# Patient Record
Sex: MALE | Race: AMERICAN INDIAN OR ALASKA NATIVE
[De-identification: names, ages, dates, MRNs, and addresses within clinical notes are randomized per-mention and may not be internally consistent; named-entity substitution may affect disease eponyms.]

---

## 2019-06-06 ENCOUNTER — HOSPITAL ENCOUNTER (EMERGENCY)
Dept: HOSPITAL 5 - ED | Age: 84
Discharge: HOME | End: 2019-06-06
Payer: COMMERCIAL

## 2019-06-06 VITALS — DIASTOLIC BLOOD PRESSURE: 76 MMHG | SYSTOLIC BLOOD PRESSURE: 133 MMHG

## 2019-06-06 DIAGNOSIS — I12.9: ICD-10-CM

## 2019-06-06 DIAGNOSIS — R41.82: ICD-10-CM

## 2019-06-06 DIAGNOSIS — Z87.820: ICD-10-CM

## 2019-06-06 DIAGNOSIS — N18.9: ICD-10-CM

## 2019-06-06 DIAGNOSIS — R56.9: Primary | ICD-10-CM

## 2019-06-06 LAB
APTT BLD: 28.1 SEC. (ref 24.2–36.6)
BASOPHILS # (AUTO): 0.1 K/MM3 (ref 0–0.1)
BASOPHILS NFR BLD AUTO: 0.6 % (ref 0–1.8)
BILIRUB UR QL STRIP: (no result)
BLOOD UR QL VISUAL: (no result)
BUN SERPL-MCNC: 19 MG/DL (ref 9–20)
BUN/CREAT SERPL: 11 %
CALCIUM SERPL-MCNC: 9.1 MG/DL (ref 8.4–10.2)
EOSINOPHIL # BLD AUTO: 0.9 K/MM3 (ref 0–0.4)
EOSINOPHIL NFR BLD AUTO: 8.8 % (ref 0–4.3)
HCT VFR BLD CALC: 43.4 % (ref 35.5–45.6)
HEMOLYSIS INDEX: 24
HGB BLD-MCNC: 14.2 GM/DL (ref 11.8–15.2)
HYALINE CASTS #/AREA URNS LPF: 1 /LPF
INR PPP: 0.99 (ref 0.87–1.13)
LYMPHOCYTES # BLD AUTO: 1.4 K/MM3 (ref 1.2–5.4)
LYMPHOCYTES NFR BLD AUTO: 14.2 % (ref 13.4–35)
MCHC RBC AUTO-ENTMCNC: 33 % (ref 32–34)
MCV RBC AUTO: 88 FL (ref 84–94)
MONOCYTES # (AUTO): 0.9 K/MM3 (ref 0–0.8)
MONOCYTES % (AUTO): 8.7 % (ref 0–7.3)
MUCOUS THREADS #/AREA URNS HPF: (no result) /HPF
PH UR STRIP: 5 [PH] (ref 5–7)
PLATELET # BLD: 282 K/MM3 (ref 140–440)
PROT UR STRIP-MCNC: (no result) MG/DL
RBC # BLD AUTO: 4.93 M/MM3 (ref 3.65–5.03)
RBC #/AREA URNS HPF: < 1 /HPF (ref 0–6)
UROBILINOGEN UR-MCNC: < 2 MG/DL (ref ?–2)
WBC #/AREA URNS HPF: < 1 /HPF (ref 0–6)

## 2019-06-06 PROCEDURE — 96365 THER/PROPH/DIAG IV INF INIT: CPT

## 2019-06-06 PROCEDURE — 85610 PROTHROMBIN TIME: CPT

## 2019-06-06 PROCEDURE — 84484 ASSAY OF TROPONIN QUANT: CPT

## 2019-06-06 PROCEDURE — 71045 X-RAY EXAM CHEST 1 VIEW: CPT

## 2019-06-06 PROCEDURE — 85670 THROMBIN TIME PLASMA: CPT

## 2019-06-06 PROCEDURE — 70450 CT HEAD/BRAIN W/O DYE: CPT

## 2019-06-06 PROCEDURE — 85730 THROMBOPLASTIN TIME PARTIAL: CPT

## 2019-06-06 PROCEDURE — 81001 URINALYSIS AUTO W/SCOPE: CPT

## 2019-06-06 PROCEDURE — 99285 EMERGENCY DEPT VISIT HI MDM: CPT

## 2019-06-06 PROCEDURE — 85025 COMPLETE CBC W/AUTO DIFF WBC: CPT

## 2019-06-06 PROCEDURE — 82962 GLUCOSE BLOOD TEST: CPT

## 2019-06-06 PROCEDURE — 93005 ELECTROCARDIOGRAM TRACING: CPT

## 2019-06-06 PROCEDURE — 36415 COLL VENOUS BLD VENIPUNCTURE: CPT

## 2019-06-06 PROCEDURE — 93010 ELECTROCARDIOGRAM REPORT: CPT

## 2019-06-06 PROCEDURE — 80048 BASIC METABOLIC PNL TOTAL CA: CPT

## 2019-06-06 NOTE — XRAY REPORT
PROCEDURE: XR CHEST 1V AP 

 

TECHNIQUE:  Chest radiograph single view.  

 

HISTORY: ams 

 

COMPARISONS: No priors 

 

FINDINGS: 

 

Cardiomediastinal silhouette within normal limits. 

Decreased inspiration. 

No airspace consolidation or pleural effusions. 

Pulmonary vasculature is within normal limits. 

 

 

IMPRESSION: Decreased inspiration. No radiographic evidence of acute disease. 

 

This document is electronically signed by Nils Melo MD., June 6 2019 08:11:09 PM ET

## 2019-06-06 NOTE — CAT SCAN REPORT
PROCEDURE:  CT HEAD/BRAIN WO CON 

 

TECHNIQUE:  Computerized tomography of the head was performed without contrast material.  

 

HISTORY: neuro deficits <6hrs or sx present upon awakening 

 

COMPARISONS:  None . 

 

FINDINGS: 

Low-attenuation in the subcortical and deep white matter of the cerebral hemispheres bilaterally most
 likely represents chronic postischemic demyelination/small vessel disease. However, a small focus of
 acute white matter ischemia cannot entirely be excluded. 

There are chronic infarcts in the basal ganglia bilaterally, left thalamus and left external capsule 
with decreased size of the left cerebral peduncle consistent with atrophy. 

There is no definite evidence of an acute intracranial process and no evidence of intracranial hemorr
mayuri or mass effect. 

Ventricular size is concordant with the degree of atrophy. 

The visualized portions of the orbits, paranasal and mastoid sinuses are notable for complete opacifi
cation of the left frontal sinus and moderate to marked bilateral ethmoid sinus mucosal thickening. 

The bony structures are unremarkable. 

 

IMPRESSION: 

1. No definite evidence of an acute intracranial process and no evidence of intracranial hemorrhage o
r mass effect. 

2. White matter changes that most likely represent chronic postischemic demyelination/small vessel di
sease. However, a small focus of acute white matter ischemia cannot entirely be excluded. 

3. Chronic infarcts basal ganglia bilaterally, left thalamus and left external capsule with atrophic 
change of the left cerebral peduncle. 

If there is a clinical suspicion of acute cerebral ischemia, MRI brain would be helpful. 

 

This document is electronically signed by Sailaja Sousa MD., June 6 2019 07:21:10 PM ET

## 2019-06-06 NOTE — EMERGENCY DEPARTMENT REPORT
ED Altered Mental Status HPI





- General


Chief Complaint: Altered Mental Status


Stated Complaint: ALTERED MENTAL/CVA


Time Seen by Provider: 06/06/19 19:10


Source: EMS


Mode of arrival: Stretcher


Limitations: Altered Mental Status, Physical Limitation





- History of Present Illness


Initial Comments: 





87 yo male with history of CVA, hypertension, dementia, CKD, currently in 

hospice care, presents to ED with altered mental status.  Wife states she was in

the kitchen, patient was in the other room when she heard the patient screaming 

out loud.  Wife states she ran into the room when the patient was and states 

that he was breathing heavily, and then his head drooped downward and he became 

unresponsive.  EMS arrived to find patient is very lethargic, minimally 

responsive.  Patient transported to ED.  Patient arrived as a stroke alert and 

was seen by teleneurologist prior to my arrival in the room.  Upon my arrival, 

patient is awake and alert.  Patient was able to state his name and his birthday

for me, answer questions, and follow commands.  Wife states this is the third 

time that this has happened.  Recently moved from [place], [place].  Following 

the first 2 episodes, patient was transported to their local ER, workups were 

done and patient was discharged home.





- Related Data


                                  Previous Rx's











 Medication  Instructions  Recorded  Last Taken  Type


 


levETIRAcetam [Keppra TAB] 250 mg PO BID #60 tablet 06/06/19 Unknown Rx











                                    Allergies











Allergy/AdvReac Type Severity Reaction Status Date / Time


 


No Known Allergies Allergy   Verified 06/06/19 18:56














ED Review of Systems


ROS: 


Stated complaint: ALTERED MENTAL/CVA


Other details as noted in HPI








ED Past Medical Hx





- Past Medical History


Hx Hypertension: Yes


Hx CVA: Yes





- Surgical History


Additional Surgical History: unable to assess





- Social History


Smoking Status: Unknown if ever smoked


Substance Use Type: None





- Medications


Home Medications: 


                                Home Medications











 Medication  Instructions  Recorded  Confirmed  Last Taken  Type


 


levETIRAcetam [Keppra TAB] 250 mg PO BID #60 tablet 06/06/19  Unknown Rx














ED Physical Exam





- General


Limitations: Altered Mental Status, Physical Limitation





- Assessment


Assessment Interval: Baseline





- Level of Consciousness


1a. Level of Consciousness: alert/keenly responsive





- LOC Questions


1b. LOC Questions: answers 1 question correctly





- LOC Command


1c. LOC Commands: performs tasks correctly





- Best Gaze


2. Best Gaze: normal





- Visual


3. Visual: no visual loss





- Facial Palsy


4. Facial Palsy: normal symmetrical movement





- Motor Arm


5a. Motor Arm Left: no drift


5b. Motor Arm Right: no gravity effort





- Motor Leg


6a. Motor Leg Left: some gravity effort


6b. Motor Leg Right: some gravity effort





- Limb Ataxia


7. Limb Ataxia: absent





- Sensory


8. Sensory: normal





- Best Language


9. Best Language: no aphasia





- Dysarthria


10. Dysarthria: normal





- Extinction and Inattention


11. Extinction/Inattention: no abnormality





- Scoring


Total Score: 8


Stroke Severity: Moderate Stroke





ED Course


                                   Vital Signs











  06/06/19 06/06/19 06/06/19





  19:07 19:12 19:15


 


Temperature  99.6 F 


 


Pulse Rate 98 H 96 H 92 H


 


Respiratory 16 17 14





Rate   


 


Blood Pressure   126/86


 


Blood Pressure  141/90 





[Left]   


 


O2 Sat by Pulse  93 95





Oximetry   














  06/06/19 06/06/19 06/06/19





  19:30 19:45 20:01


 


Temperature   


 


Pulse Rate 85 87 80


 


Respiratory 15 15 15





Rate   


 


Blood Pressure 119/69 106/76 135/63


 


Blood Pressure   





[Left]   


 


O2 Sat by Pulse 94 98 99





Oximetry   














  06/06/19 06/06/19 06/06/19





  20:15 20:30 20:45


 


Temperature   


 


Pulse Rate 77 73 71


 


Respiratory 15 14 14





Rate   


 


Blood Pressure 125/72 127/66 128/70


 


Blood Pressure   





[Left]   


 


O2 Sat by Pulse 99 100 98





Oximetry   














  06/06/19 06/06/19 06/06/19





  21:00 21:15 21:31


 


Temperature   


 


Pulse Rate 71 80 72


 


Respiratory 14 17 13





Rate   


 


Blood Pressure 129/67 127/66 140/71


 


Blood Pressure   





[Left]   


 


O2 Sat by Pulse 100  





Oximetry   














  06/06/19 06/06/19 06/06/19





  21:45 22:01 22:02


 


Temperature   


 


Pulse Rate 69 72 


 


Respiratory 14 14 





Rate   


 


Blood Pressure 140/73 147/83 


 


Blood Pressure   





[Left]   


 


O2 Sat by Pulse   99





Oximetry   














  06/06/19 06/06/19 06/06/19





  22:15 22:30 22:45


 


Temperature   


 


Pulse Rate   71


 


Respiratory 14 14 14





Rate   


 


Blood Pressure 144/74 141/79 133/76


 


Blood Pressure   





[Left]   


 


O2 Sat by Pulse   





Oximetry   














- Lab Data


Result diagrams: 


                                 06/06/19 19:26





                                 06/06/19 19:26


                                   Lab Results











  06/06/19 06/06/19 06/06/19 Range/Units





  19:16 19:26 19:26 


 


WBC   10.1   (4.5-11.0)  K/mm3


 


RBC   4.93   (3.65-5.03)  M/mm3


 


Hgb   14.2   (11.8-15.2)  gm/dl


 


Hct   43.4   (35.5-45.6)  %


 


MCV   88   (84-94)  fl


 


MCH   29   (28-32)  pg


 


MCHC   33   (32-34)  %


 


RDW   14.3   (13.2-15.2)  %


 


Plt Count   282   (140-440)  K/mm3


 


Lymph % (Auto)   14.2   (13.4-35.0)  %


 


Mono % (Auto)   8.7 H   (0.0-7.3)  %


 


Eos % (Auto)   8.8 H   (0.0-4.3)  %


 


Baso % (Auto)   0.6   (0.0-1.8)  %


 


Lymph #   1.4   (1.2-5.4)  K/mm3


 


Mono #   0.9 H   (0.0-0.8)  K/mm3


 


Eos #   0.9 H   (0.0-0.4)  K/mm3


 


Baso #   0.1   (0.0-0.1)  K/mm3


 


Seg Neutrophils %   67.7   (40.0-70.0)  %


 


Seg Neutrophils #   6.9   (1.8-7.7)  K/mm3


 


PT    13.7  (12.2-14.9)  Sec.


 


INR    0.99  (0.87-1.13)  


 


APTT    28.1  (24.2-36.6)  Sec.


 


Thrombin Time     (15.1-19.6)  Sec.


 


Sodium     (137-145)  mmol/L


 


Potassium     (3.6-5.0)  mmol/L


 


Chloride     ()  mmol/L


 


Carbon Dioxide     (22-30)  mmol/L


 


Anion Gap     mmol/L


 


BUN     (9-20)  mg/dL


 


Creatinine     (0.8-1.5)  mg/dL


 


Estimated GFR     ml/min


 


BUN/Creatinine Ratio     %


 


Glucose     ()  mg/dL


 


POC Glucose  120 H    ()  


 


Calcium     (8.4-10.2)  mg/dL


 


Troponin T     (0.00-0.029)  ng/mL


 


Urine Color     (Yellow)  


 


Urine Turbidity     (Clear)  


 


Urine pH     (5.0-7.0)  


 


Ur Specific Gravity     (1.003-1.030)  


 


Urine Protein     (Negative)  mg/dL


 


Urine Glucose (UA)     (Negative)  mg/dL


 


Urine Ketones     (Negative)  mg/dL


 


Urine Blood     (Negative)  


 


Urine Nitrite     (Negative)  


 


Urine Bilirubin     (Negative)  


 


Urine Urobilinogen     (<2.0)  mg/dL


 


Ur Leukocyte Esterase     (Negative)  


 


Urine WBC (Auto)     (0.0-6.0)  /HPF


 


Urine RBC (Auto)     (0.0-6.0)  /HPF


 


Hyaline Casts     /LPF


 


Urine Mucus     /HPF














  06/06/19 06/06/19 06/06/19 Range/Units





  19:26 19:26 19:52 


 


WBC     (4.5-11.0)  K/mm3


 


RBC     (3.65-5.03)  M/mm3


 


Hgb     (11.8-15.2)  gm/dl


 


Hct     (35.5-45.6)  %


 


MCV     (84-94)  fl


 


MCH     (28-32)  pg


 


MCHC     (32-34)  %


 


RDW     (13.2-15.2)  %


 


Plt Count     (140-440)  K/mm3


 


Lymph % (Auto)     (13.4-35.0)  %


 


Mono % (Auto)     (0.0-7.3)  %


 


Eos % (Auto)     (0.0-4.3)  %


 


Baso % (Auto)     (0.0-1.8)  %


 


Lymph #     (1.2-5.4)  K/mm3


 


Mono #     (0.0-0.8)  K/mm3


 


Eos #     (0.0-0.4)  K/mm3


 


Baso #     (0.0-0.1)  K/mm3


 


Seg Neutrophils %     (40.0-70.0)  %


 


Seg Neutrophils #     (1.8-7.7)  K/mm3


 


PT     (12.2-14.9)  Sec.


 


INR     (0.87-1.13)  


 


APTT     (24.2-36.6)  Sec.


 


Thrombin Time   19.6   (15.1-19.6)  Sec.


 


Sodium  142    (137-145)  mmol/L


 


Potassium  4.7    (3.6-5.0)  mmol/L


 


Chloride  105.1    ()  mmol/L


 


Carbon Dioxide  25    (22-30)  mmol/L


 


Anion Gap  17    mmol/L


 


BUN  19    (9-20)  mg/dL


 


Creatinine  1.8 H    (0.8-1.5)  mg/dL


 


Estimated GFR  44    ml/min


 


BUN/Creatinine Ratio  11    %


 


Glucose  121 H    ()  mg/dL


 


POC Glucose     ()  


 


Calcium  9.1    (8.4-10.2)  mg/dL


 


Troponin T  < 0.010    (0.00-0.029)  ng/mL


 


Urine Color    Yellow  (Yellow)  


 


Urine Turbidity    Slightly-cloudy  (Clear)  


 


Urine pH    5.0  (5.0-7.0)  


 


Ur Specific Gravity    1.016  (1.003-1.030)  


 


Urine Protein    <15 mg/dl  (Negative)  mg/dL


 


Urine Glucose (UA)    Neg  (Negative)  mg/dL


 


Urine Ketones    Neg  (Negative)  mg/dL


 


Urine Blood    Sm  (Negative)  


 


Urine Nitrite    Neg  (Negative)  


 


Urine Bilirubin    Neg  (Negative)  


 


Urine Urobilinogen    < 2.0  (<2.0)  mg/dL


 


Ur Leukocyte Esterase    Neg  (Negative)  


 


Urine WBC (Auto)    < 1.0  (0.0-6.0)  /HPF


 


Urine RBC (Auto)    < 1.0  (0.0-6.0)  /HPF


 


Hyaline Casts    1  /LPF


 


Urine Mucus    Few  /HPF














- EKG Data


-: EKG Interpreted by Me


EKG shows normal: sinus rhythm, axis, intervals, QRS complexes, ST-T waves


Rate: normal


Interpretation: no acute changes, other (old inferior infarct)





- Radiology Data


Radiology results: report reviewed, image reviewed





- Medical Decision Making





Wife and granddaughter at bedside.  Reports this is 3rd occurrence of this type 

of episode.  Sounds like possible seizure, given pt's hx of CVA in the past.   

Wife states pt has been to the ER previously but never prescribed seizure 

medication.  Pt was likely initially post-ictal but has since improved his 

mental status.  EMS reported that he was also hypoxic during that time, however,

O2 sats normal on room air.  CT Head, CXR negative for any acute disease.  Labs 

unremarkable. Pt is currently in hospice care.  Spoke with family and they are 

comfortable with taking the pt home and initiating Keppra since this is not the 

first time that this has happened, and since he is back to his baseline.  Keppra

bolus given here in ED.  Pt given rx for Keppra 250 BID given his CKD.  Advised 

neurology f/u.  Return precautions given.





- Differential Diagnosis


seizure, CVA, infection


Critical care attestation.: 


If time is entered above; I have spent that time in minutes in the direct care 

of this critically ill patient, excluding procedure time.








ED Disposition


Clinical Impression: 


 Seizure





Disposition: DC-01 TO HOME OR SELFCARE


Is pt being admited?: No


Condition: Stable


Instructions:  Recurrent Seizures Adult (ED)


Prescriptions: 


levETIRAcetam [Keppra TAB] 250 mg PO BID #60 tablet


Referrals: 


DANIS ARANA MD [Referring] - 3-5 Days


PRIMARY CARE,MD [Referring] - 3-5 Days


Time of Disposition: 22:40

## 2020-02-07 ENCOUNTER — HOSPITAL ENCOUNTER (INPATIENT)
Dept: HOSPITAL 5 - ED | Age: 85
LOS: 9 days | Discharge: HOSPICE | DRG: 871 | End: 2020-02-16
Attending: INTERNAL MEDICINE | Admitting: INTERNAL MEDICINE
Payer: MEDICARE

## 2020-02-07 DIAGNOSIS — L03.317: ICD-10-CM

## 2020-02-07 DIAGNOSIS — I12.9: ICD-10-CM

## 2020-02-07 DIAGNOSIS — A41.9: Primary | ICD-10-CM

## 2020-02-07 DIAGNOSIS — N30.01: ICD-10-CM

## 2020-02-07 DIAGNOSIS — F03.90: ICD-10-CM

## 2020-02-07 DIAGNOSIS — Z79.899: ICD-10-CM

## 2020-02-07 DIAGNOSIS — N18.9: ICD-10-CM

## 2020-02-07 DIAGNOSIS — Z86.73: ICD-10-CM

## 2020-02-07 DIAGNOSIS — N17.9: ICD-10-CM

## 2020-02-07 DIAGNOSIS — Z79.82: ICD-10-CM

## 2020-02-07 DIAGNOSIS — E87.0: ICD-10-CM

## 2020-02-07 DIAGNOSIS — R82.81: ICD-10-CM

## 2020-02-07 DIAGNOSIS — G92: ICD-10-CM

## 2020-02-07 LAB
ALBUMIN SERPL-MCNC: 2.4 G/DL (ref 3.9–5)
ALT SERPL-CCNC: 7 UNITS/L (ref 7–56)
BACTERIA #/AREA URNS HPF: (no result) /HPF
BASOPHILS # (AUTO): 0.2 K/MM3 (ref 0–0.1)
BASOPHILS NFR BLD AUTO: 1.1 % (ref 0–1.8)
BILIRUB UR QL STRIP: (no result)
BLOOD UR QL VISUAL: (no result)
BUN SERPL-MCNC: 20 MG/DL (ref 9–20)
BUN/CREAT SERPL: 8 %
CALCIUM SERPL-MCNC: 8.4 MG/DL (ref 8.4–10.2)
EOSINOPHIL # BLD AUTO: 1.5 K/MM3 (ref 0–0.4)
EOSINOPHIL NFR BLD AUTO: 9.2 % (ref 0–4.3)
HCT VFR BLD CALC: 33.9 % (ref 35.5–45.6)
HEMOLYSIS INDEX: 2
HGB BLD-MCNC: 11.2 GM/DL (ref 11.8–15.2)
HYALINE CASTS #/AREA URNS LPF: 1 /LPF
LYMPHOCYTES # BLD AUTO: 1.1 K/MM3 (ref 1.2–5.4)
LYMPHOCYTES NFR BLD AUTO: 6.8 % (ref 13.4–35)
MCHC RBC AUTO-ENTMCNC: 33 % (ref 32–34)
MCV RBC AUTO: 88 FL (ref 84–94)
MONOCYTES # (AUTO): 1.8 K/MM3 (ref 0–0.8)
MONOCYTES % (AUTO): 10.9 % (ref 0–7.3)
MUCOUS THREADS #/AREA URNS HPF: (no result) /HPF
PH UR STRIP: 5 [PH] (ref 5–7)
PLATELET # BLD: 618 K/MM3 (ref 140–440)
PROT UR STRIP-MCNC: (no result) MG/DL
RBC # BLD AUTO: 3.87 M/MM3 (ref 3.65–5.03)
RBC #/AREA URNS HPF: 4 /HPF (ref 0–6)
UROBILINOGEN UR-MCNC: < 2 MG/DL (ref ?–2)
WBC #/AREA URNS HPF: 19 /HPF (ref 0–6)

## 2020-02-07 PROCEDURE — 96366 THER/PROPH/DIAG IV INF ADDON: CPT

## 2020-02-07 PROCEDURE — 85027 COMPLETE CBC AUTOMATED: CPT

## 2020-02-07 PROCEDURE — 86850 RBC ANTIBODY SCREEN: CPT

## 2020-02-07 PROCEDURE — 87086 URINE CULTURE/COLONY COUNT: CPT

## 2020-02-07 PROCEDURE — 84484 ASSAY OF TROPONIN QUANT: CPT

## 2020-02-07 PROCEDURE — 87116 MYCOBACTERIA CULTURE: CPT

## 2020-02-07 PROCEDURE — 82962 GLUCOSE BLOOD TEST: CPT

## 2020-02-07 PROCEDURE — 80053 COMPREHEN METABOLIC PANEL: CPT

## 2020-02-07 PROCEDURE — 93970 EXTREMITY STUDY: CPT

## 2020-02-07 PROCEDURE — 87040 BLOOD CULTURE FOR BACTERIA: CPT

## 2020-02-07 PROCEDURE — 93010 ELECTROCARDIOGRAM REPORT: CPT

## 2020-02-07 PROCEDURE — 93005 ELECTROCARDIOGRAM TRACING: CPT

## 2020-02-07 PROCEDURE — 71045 X-RAY EXAM CHEST 1 VIEW: CPT

## 2020-02-07 PROCEDURE — 85007 BL SMEAR W/DIFF WBC COUNT: CPT

## 2020-02-07 PROCEDURE — 80048 BASIC METABOLIC PNL TOTAL CA: CPT

## 2020-02-07 PROCEDURE — 85025 COMPLETE CBC W/AUTO DIFF WBC: CPT

## 2020-02-07 PROCEDURE — 86900 BLOOD TYPING SEROLOGIC ABO: CPT

## 2020-02-07 PROCEDURE — 86901 BLOOD TYPING SEROLOGIC RH(D): CPT

## 2020-02-07 PROCEDURE — 82140 ASSAY OF AMMONIA: CPT

## 2020-02-07 PROCEDURE — 96368 THER/DIAG CONCURRENT INF: CPT

## 2020-02-07 PROCEDURE — 81001 URINALYSIS AUTO W/SCOPE: CPT

## 2020-02-07 PROCEDURE — 96365 THER/PROPH/DIAG IV INF INIT: CPT

## 2020-02-07 PROCEDURE — 87400 INFLUENZA A/B EACH AG IA: CPT

## 2020-02-07 PROCEDURE — 36415 COLL VENOUS BLD VENIPUNCTURE: CPT

## 2020-02-07 NOTE — EMERGENCY DEPARTMENT REPORT
ED General Adult HPI





- General


Stated complaint: SEPSIS


Time Seen by Provider: 02/07/20 22:50


Source: family


Mode of arrival: Stretcher


Limitations: Altered Mental Status, Physical Limitation





- History of Present Illness


Initial comments: 





Patient is an 86-year-old male patient presents emergency room for fever and 

cough.  Patient has a history of dementia and has increased confusion.  Family 

at bedside.  History per family.  Family states that the patient has a rash on 

his back and buttocks.  Family states that the patient has been itching his 

buttocks a lot and causing it to bleed.  Family states he is having a lot of dry

skin on his back and buttock region.  Family states the patient has had a dry 

cough and a fever for 2 days.  Patient lives at home.  Patient got a flu shot 

this year.  Patient is up-to-date on his pneumonia vaccines.  Patient has a 

history of dementia.


-: Sudden


Consistency: constant


Improves with: none


Worsens with: none


Associated Symptoms: confusion, cough, fever/chills


Treatments Prior to Arrival: none





- Related Data


                                Home Medications











 Medication  Instructions  Recorded  Confirmed  Last Taken


 


Aspirin 325 mg PO ONCE 02/08/20 02/08/20 Unknown


 


Doxepin [SINEquan] 10 mg PO QHS 02/08/20 02/08/20 Unknown


 


Loratadine 10 mg PO DAILY 02/08/20 02/08/20 Unknown


 


Sennosides/Docusate [Senokot S] 2 each PO QHS 02/08/20 02/08/20 Unknown


 


amLODIPine [Norvasc] 5 mg PO DAILY 02/08/20 02/08/20 Unknown


 


hydrOXYzine HCL [Atarax] 25 mg PO TID 02/08/20 02/08/20 Unknown


 


lisinopriL [Zestril] 20 mg PO QDAY 02/08/20 02/08/20 Unknown








                                  Previous Rx's











 Medication  Instructions  Recorded  Last Taken  Type


 


levETIRAcetam [Keppra TAB] 250 mg PO BID #60 tablet 06/06/19 Unknown Rx











                                    Allergies











Allergy/AdvReac Type Severity Reaction Status Date / Time


 


No Known Allergies Allergy   Verified 06/06/19 18:56














ED Review of Systems


ROS: 


Stated complaint: SEPSIS


Other details as noted in HPI





Comment: Unobtainable due to pts medical conditions


Constitutional: fever


Respiratory: cough


Skin: rash, lesions





ED Past Medical Hx





- Past Medical History


Previous Medical History?: Yes


Hx Hypertension: Yes


Hx CVA: Yes


Hx Liver Disease: No


Hx Renal Disease: No





- Surgical History


Past Surgical History?: No


Additional Surgical History: unable to assess





- Family History


Family history: no significant





- Social History


Smoking Status: Unknown if ever smoked


Substance Use Type: None





- Medications


Home Medications: 


                                Home Medications











 Medication  Instructions  Recorded  Confirmed  Last Taken  Type


 


levETIRAcetam [Keppra TAB] 250 mg PO BID #60 tablet 06/06/19 02/08/20 Unknown Rx


 


Aspirin 325 mg PO ONCE 02/08/20 02/08/20 Unknown History


 


Doxepin [SINEquan] 10 mg PO QHS 02/08/20 02/08/20 Unknown History


 


Loratadine 10 mg PO DAILY 02/08/20 02/08/20 Unknown History


 


Sennosides/Docusate [Senokot S] 2 each PO QHS 02/08/20 02/08/20 Unknown History


 


amLODIPine [Norvasc] 5 mg PO DAILY 02/08/20 02/08/20 Unknown History


 


hydrOXYzine HCL [Atarax] 25 mg PO TID 02/08/20 02/08/20 Unknown History


 


lisinopriL [Zestril] 20 mg PO QDAY 02/08/20 02/08/20 Unknown History














ED Physical Exam





- General


Limitations: Altered Mental Status, Physical Limitation


General appearance: alert, in no apparent distress





- Head


Head exam: Present: atraumatic, normocephalic





- Eye


Eye exam: Present: normal appearance, PERRL


Pupils: Present: normal accommodation





- ENT


ENT exam: Present: mucous membranes moist





- Neck


Neck exam: Present: normal inspection





- Respiratory


Respiratory exam: Present: normal lung sounds bilaterally.  Absent: respiratory 

distress, wheezes, rales





- Cardiovascular


Cardiovascular Exam: Present: regular rate, normal rhythm.  Absent: systolic 

murmur, diastolic murmur, rubs, gallop





- GI/Abdominal


GI/Abdominal exam: Present: soft, normal bowel sounds.  Absent: distended, 

tenderness, guarding





- Rectal


Rectal exam: Present: deferred





- Extremities Exam


Extremities exam: Present: normal inspection





- Back Exam


Back exam: Present: normal inspection





- Neurological Exam


Neurological exam: Present: alert, altered





- Skin


Skin exam: Present: warm, dry, normal color, rash, erythema (larger cellulitis 

noted to the right buttock.  Excoriation noted to the perineum and buttock.)





ED Course


                                   Vital Signs











  02/07/20 02/07/20 02/07/20





  22:45 22:55 22:58


 


Temperature  102.8 F H 


 


Pulse Rate 108 H 107 H 


 


Respiratory 18 18 18





Rate   


 


Blood Pressure 131/67 131/67 


 


O2 Sat by Pulse 99  99





Oximetry   














  02/07/20 02/07/20 02/07/20





  23:00 23:15 23:30


 


Temperature   


 


Pulse Rate 108 H 108 H 104 H


 


Respiratory 21 18 15





Rate   


 


Blood Pressure 125/79 130/74 120/68


 


O2 Sat by Pulse 97 96 98





Oximetry   














  02/07/20 02/08/20 02/08/20





  23:45 00:00 00:15


 


Temperature   


 


Pulse Rate 103 H 105 H 110 H


 


Respiratory 17 15 17





Rate   


 


Blood Pressure 126/67 139/73 148/70


 


O2 Sat by Pulse 96 96 96





Oximetry   














  02/08/20 02/08/20 02/08/20





  00:30 00:45 01:00


 


Temperature   


 


Pulse Rate 105 H 106 H 


 


Respiratory 22 20 16





Rate   


 


Blood Pressure 140/75 142/67 134/77


 


O2 Sat by Pulse 94  90





Oximetry   














  02/08/20 02/08/20 02/08/20





  01:15 01:31 01:45


 


Temperature   


 


Pulse Rate  101 H 96 H


 


Respiratory 18 18 19





Rate   


 


Blood Pressure 126/72 121/77 132/67


 


O2 Sat by Pulse 96 98 99





Oximetry   














  02/08/20 02/08/20 02/08/20





  02:00 02:15 02:30


 


Temperature   


 


Pulse Rate   


 


Respiratory 22 19 17





Rate   


 


Blood Pressure 130/70 132/75 138/79


 


O2 Sat by Pulse 98 97 90





Oximetry   














  02/08/20 02/08/20 02/08/20





  02:45 03:00 03:15


 


Temperature   


 


Pulse Rate   


 


Respiratory 20 11 L 18





Rate   


 


Blood Pressure 122/72 126/70 126/57


 


O2 Sat by Pulse 94 97 98





Oximetry   














  02/08/20 02/08/20 02/08/20





  03:31 03:45 04:00


 


Temperature   


 


Pulse Rate   


 


Respiratory 16 19 19





Rate   


 


Blood Pressure 131/73 120/72 125/70


 


O2 Sat by Pulse 93 96 97





Oximetry   














  02/08/20 02/08/20 02/08/20





  04:10 04:11 04:21


 


Temperature 98.7 F  


 


Pulse Rate 91 H  


 


Respiratory  15 19





Rate   


 


Blood Pressure  125/70 129/74


 


O2 Sat by Pulse  96 95





Oximetry   














- Reevaluation(s)


Reevaluation #1: 


Initial evaluation done.  Code sepsis called by the nurse.  Patient is 

tachycardic and febrile.  Noted to have cellulitis of the right buttock as well 

as larger excoriation to the Mer[place]-area. Patient's blood pressure is normal.  

Patient will be given fluids and a sepsis protocol will be done.


02/07/20 22:50








Reevaluation #2: 


Patient's heart rate is improving.  Patient's blood pressure stable.


02/07/20 23:39





Reevaluation #3: 


pt more talkative.  Patient our rate is improving.  Patient had a Contreras placed. 

 


02/08/20 00:16





Reevaluation #4: 


I discussed all results with patient and family.  I discussed plan of care with 

patient and family.  Patient will be admitted to the hospitalist service.  

Patient and family agreed with plan of care.


02/08/20 00:36








- Consultations


Consultation #1: 


Hospitalist consult for admission.  Hospitalist to admit patient.  Bridge orders

 place.


02/08/20 00:36








ED Medical Decision Making





- Lab Data


Result diagrams: 


                                 02/07/20 22:56





                                 02/07/20 22:56





- EKG Data


-: EKG Interpreted by Me


EKG shows normal: sinus rhythm, axis, intervals, QRS complexes, ST-T waves


Rate: tachycardia





- Radiology Data


Radiology results: report reviewed, image reviewed


interpreted by me: 





No acute findings on chest x-ray





- Medical Decision Making





pt is an 86-year-old male who presents emergency room with complaints of fever 

and cough.  Patient also found to have a rash and irritation to his buttocks and

 perineal area.  On initial evaluation patient found to be tachycardic and 

febrile, code sepsis initiated immediately and patient given fluids and 

antibiotics immediately.  Patient source of infection found to be a right 

buttock cellulitis and a UTI.  Patient's chest x-ray negative.  Patient's labs 

are remarkable for acute renal failure, and elevated WBC.  Patient admitted to 

the hospitalist service.  Patient's heart rate improved with treatment.





- Differential Diagnosis


fever. sepsis, cough, uti. cellulitis


Critical Care Time: Yes


Critical care time in (mins) excluding proc time.: 45


Critical care attestation.: 


If time is entered above; I have spent that time in minutes in the direct care 

of this critically ill patient, excluding procedure time.





Critical Care Time: 





45 minutes





ED Disposition


Clinical Impression: 


Sepsis


Qualifiers:


 Sepsis type: sepsis due to unspecified organism Sepsis acute organ dysfunction 

status: with acute organ dysfunction Severe sepsis acute organ dysfunction type:

 acute renal failure Acute renal failure type: unspecified Severe sepsis shock 

status: without septic shock Qualified Code(s): A41.9 - Sepsis, unspecified 

organism





Cellulitis


Qualifiers:


 Site of cellulitis: buttock Qualified Code(s): L03.317 - Cellulitis of buttock





UTI (urinary tract infection)


Qualifiers:


 Urinary tract infection type: acute cystitis Hematuria presence: with hematuria

 Qualified Code(s): N30.01 - Acute cystitis with hematuria





Fever


Qualifiers:


 Fever type: unspecified Qualified Code(s): R50.9 - Fever, unspecified





Renal failure


Qualifiers:


 Renal failure chronicity: acute Acute renal failure type: unspecified Qualified

 Code(s): N17.9 - Acute kidney failure, unspecified





Disposition: DC-09 OP ADMIT IP TO THIS HOSP


Is pt being admited?: Yes


Does the pt Need Aspirin: No


Condition: Critical


Time of Disposition: 00:16

## 2020-02-07 NOTE — XRAY REPORT
CHEST 1 VIEW 



INDICATION / CLINICAL INFORMATION:

fever. cough.



COMPARISON: 

6/6/2019



FINDINGS:



SUPPORT DEVICES: None.



HEART / MEDIASTINUM: No significant abnormality. 



LUNGS / PLEURA: No significant pulmonary or pleural abnormality. No pneumothorax. 



ADDITIONAL FINDINGS: No significant additional findings.



IMPRESSION:

1. No acute findings.



Signer Name: Miguel Angel Cosby MD 

Signed: 2/7/2020 11:03 PM

 Workstation Name: [id number]

## 2020-02-08 RX ADMIN — HEPARIN SODIUM SCH UNIT: 5000 INJECTION, SOLUTION INTRAVENOUS; SUBCUTANEOUS at 21:11

## 2020-02-08 RX ADMIN — CEFEPIME SCH MLS/HR: 2 INJECTION, POWDER, FOR SOLUTION INTRAVENOUS at 09:19

## 2020-02-08 RX ADMIN — HEPARIN SODIUM SCH UNIT: 5000 INJECTION, SOLUTION INTRAVENOUS; SUBCUTANEOUS at 09:28

## 2020-02-08 RX ADMIN — SODIUM CHLORIDE SCH MLS/HR: 0.9 INJECTION, SOLUTION INTRAVENOUS at 20:40

## 2020-02-08 RX ADMIN — Medication SCH ML: at 09:26

## 2020-02-08 RX ADMIN — ACETAMINOPHEN PRN MG: 325 TABLET ORAL at 19:46

## 2020-02-08 RX ADMIN — Medication SCH ML: at 21:15

## 2020-02-08 RX ADMIN — SODIUM CHLORIDE SCH MLS/HR: 0.9 INJECTION, SOLUTION INTRAVENOUS at 04:53

## 2020-02-08 NOTE — PROGRESS NOTE
Assessment and Plan


Assessment and plan: 





Toxic metabolic encephalopathy


-Multifactorial, sepsis, UTI, cellulitis


-Blood cultures pending


-neuro checks


-Continue supportive care





Sepsis


-Leukocytosis 16.1


-Tachycardic with heart rate 105 bpm


-Continue IV Abx


-Cultures pending


-Continue supportive care





CKD


-Avoid nephrotoxic agents


-Renal dose all meds


-strict I/O,


-monitor uop q shift,





Acute cystitis


-IV antibiotics continued


-Culture pending


-IV fluids





Dementia


-Reorient as needed


-Supportive care


-Continue home meds once reconciled





Cellulitis


-site, buttock


-Wound care consult


-On IV ABX





 DVT prophylaxis


-SCDs bilateral extremities


-Heparin subcu





History


Interval history: 





No new issues overnight.





Hospitalist Physical





- Constitutional


Vitals: 


                                        











Temp Pulse Resp BP Pulse Ox


 


 98.5 F   89   18   131/65   95 


 


 02/08/20 07:24  02/08/20 07:24  02/08/20 07:24  02/08/20 07:24  02/08/20 07:24











General appearance: Present: no acute distress, well-nourished





- EENT


Eyes: Present: PERRL, EOM intact


ENT: hearing intact, clear oral mucosa, dentition normal





- Neck


Neck: Present: supple, normal ROM





- Respiratory


Respiratory effort: normal


Respiratory: bilateral: CTA





- Cardiovascular


Rhythm: regular


Heart Sounds: Present: S1 & S2.  Absent: gallop, rub





- Extremities


Extremities: no ischemia, No edema, Full ROM





- Abdominal


General gastrointestinal: soft, non-tender, non-distended, normal bowel sounds





- Integumentary


Integumentary: Present: clear, warm, dry





- Neurologic


Neurologic: CNII-XII intact, moves all extremities





Results





- Labs


CBC & Chem 7: 


                                 02/07/20 22:56





                                 02/07/20 22:56


Labs: 


                             Laboratory Last Values











WBC  16.1 K/mm3 (4.5-11.0)  H  02/07/20  22:56    


 


RBC  3.87 M/mm3 (3.65-5.03)   02/07/20  22:56    


 


Hgb  11.2 gm/dl (11.8-15.2)  L  02/07/20  22:56    


 


Hct  33.9 % (35.5-45.6)  L  02/07/20  22:56    


 


MCV  88 fl (84-94)   02/07/20  22:56    


 


MCH  29 pg (28-32)   02/07/20  22:56    


 


MCHC  33 % (32-34)   02/07/20  22:56    


 


RDW  13.9 % (13.2-15.2)   02/07/20  22:56    


 


Plt Count  618 K/mm3 (140-440)  H  02/07/20  22:56    


 


Lymph % (Auto)  6.8 % (13.4-35.0)  L  02/07/20  22:56    


 


Mono % (Auto)  10.9 % (0.0-7.3)  H  02/07/20  22:56    


 


Eos % (Auto)  9.2 % (0.0-4.3)  H  02/07/20  22:56    


 


Baso % (Auto)  1.1 % (0.0-1.8)   02/07/20  22:56    


 


Lymph #  1.1 K/mm3 (1.2-5.4)  L  02/07/20  22:56    


 


Mono #  1.8 K/mm3 (0.0-0.8)  H  02/07/20  22:56    


 


Eos #  1.5 K/mm3 (0.0-0.4)  H  02/07/20  22:56    


 


Baso #  0.2 K/mm3 (0.0-0.1)  H  02/07/20  22:56    


 


Seg Neutrophils %  72.0 % (40.0-70.0)  H  02/07/20  22:56    


 


Seg Neutrophils #  11.6 K/mm3 (1.8-7.7)  H  02/07/20  22:56    


 


Sodium  147 mmol/L (137-145)  H  02/07/20  22:56    


 


Potassium  4.2 mmol/L (3.6-5.0)   02/07/20  22:56    


 


Chloride  114.9 mmol/L ()  H  02/07/20  22:56    


 


Carbon Dioxide  20 mmol/L (22-30)  L  02/07/20  22:56    


 


Anion Gap  16 mmol/L  02/07/20  22:56    


 


BUN  20 mg/dL (9-20)   02/07/20  22:56    


 


Creatinine  2.6 mg/dL (0.8-1.5)  H  02/07/20  22:56    


 


Estimated GFR  28 ml/min  02/07/20  22:56    


 


BUN/Creatinine Ratio  8 %  02/07/20  22:56    


 


Glucose  110 mg/dL ()  H  02/07/20  22:56    


 


Lactic Acid  1.20 mmol/L (0.7-2.0)   02/08/20  09:24    


 


Calcium  8.4 mg/dL (8.4-10.2)   02/07/20  22:56    


 


Total Bilirubin  0.20 mg/dL (0.1-1.2)   02/07/20  22:56    


 


AST  15 units/L (5-40)   02/07/20  22:56    


 


ALT  7 units/L (7-56)   02/07/20  22:56    


 


Alkaline Phosphatase  69 units/L ()   02/07/20  22:56    


 


Troponin T  0.021 ng/mL (0.00-0.029)   02/07/20  22:56    


 


Total Protein  6.9 g/dL (6.3-8.2)   02/07/20  22:56    


 


Albumin  2.4 g/dL (3.9-5)  L  02/07/20  22:56    


 


Albumin/Globulin Ratio  0.5 %  02/07/20  22:56    


 


Urine Color  Yellow  (Yellow)   02/07/20  Unknown


 


Urine Turbidity  Slightly-cloudy  (Clear)   02/07/20  Unknown


 


Urine pH  5.0  (5.0-7.0)   02/07/20  Unknown


 


Ur Specific Gravity  1.013  (1.003-1.030)   02/07/20  Unknown


 


Urine Protein  <15 mg/dl mg/dL (Negative)   02/07/20  Unknown


 


Urine Glucose (UA)  Neg mg/dL (Negative)   02/07/20  Unknown


 


Urine Ketones  Neg mg/dL (Negative)   02/07/20  Unknown


 


Urine Blood  Sm  (Negative)   02/07/20  Unknown


 


Urine Nitrite  Neg  (Negative)   02/07/20  Unknown


 


Urine Bilirubin  Neg  (Negative)   02/07/20  Unknown


 


Urine Urobilinogen  < 2.0 mg/dL (<2.0)   02/07/20  Unknown


 


Ur Leukocyte Esterase  Sm  (Negative)   02/07/20  Unknown


 


Urine WBC (Auto)  19.0 /HPF (0.0-6.0)  H  02/07/20  Unknown


 


Urine RBC (Auto)  4.0 /HPF (0.0-6.0)   02/07/20  Unknown


 


U Epithel Cells (Auto)  < 1.0 /HPF (0-13.0)   02/07/20  Unknown


 


Urine Bacteria (Auto)  1+ /HPF (Negative)   02/07/20  Unknown


 


Hyaline Casts  1 /LPF  02/07/20  Unknown


 


Urine Mucus  Few /HPF  02/07/20  Unknown


 


Blood Type  O POSITIVE   02/08/20  02:30    


 


Antibody Screen  Negative   02/08/20  02:30    














Active Medications





- Current Medications


Current Medications: 














Generic Name Dose Route Start Last Admin





  Trade Name Freq  PRN Reason Stop Dose Admin


 


Acetaminophen  650 mg  02/08/20 00:43 





  Tylenol  PO  





  Q4H PRN  





  Pain MILD(1-3)/Fever >100.5/HA  


 


Heparin Sodium (Porcine)  5,000 unit  02/08/20 10:00  02/08/20 09:28





  Heparin  SUB-Q   5,000 unit





  Q12HR DACIA   Administration


 


Cefepime HCl  2 gm in 100 mls @ 200 mls/hr  02/08/20 10:00  02/08/20 09:19





  Cefepime/Ns 2 Gm/100 Ml  IV   200 mls/hr





  Q24HR DACIA   Administration





  Protocol  


 


Sodium Chloride  1,000 mls @ 75 mls/hr  02/08/20 04:30  02/08/20 04:53





  Nacl 0.9% 1000 Ml  IV   75 mls/hr





  AS DIRECT DACIA   Administration


 


Ondansetron HCl  4 mg  02/08/20 00:43 





  Zofran  IV  





  Q8H PRN  





  Nausea And Vomiting  


 


Sodium Chloride  10 ml  02/08/20 10:00  02/08/20 09:26





  Sodium Chloride Flush Syringe 10 Ml  IV   10 ml





  BID DACIA   Administration


 


Sodium Chloride  10 ml  02/08/20 00:43 





  Sodium Chloride Flush Syringe 10 Ml  IV  





  PRN PRN  





  LINE FLUSH  














Nutrition/Malnutrition Assess





- Dietary Evaluation


Nutrition/Malnutrition Findings: 


                                        





Nutrition Notes                                            Start:  02/08/20 

11:12


Freq:                                                      Status: Active       




Protocol:                                                                       




 Document     02/08/20 11:12  CT  (Rec: 02/08/20 11:19  CT  [id number])


 Co-Sign      02/08/20 11:12  LP


 Nutrition Notes


     Need for Assessment generated from:         RN Referral,MST


     Initial or Follow up                        Assessment


     Current Diagnosis                           CKD(stage I-IV),Sepsis,


                                                 Hypertension,Stroke


     Other Pertinent Diagnosis                   AMS, dementia, Acute Metabolic


                                                 Encephalopathy, wounds


     Current Diet                                Cardiac Diet


     Labs/Tests                                  Na 147


                                                 Cr 2.6


                                                 Glu 110


                                                 Albumin 2.4


     Pertinent Medications                       NS 75 ml/hr


     Height                                      5 ft 6 in


     Weight                                      78 kg


     Usual Body Weight                           65.771 kg


     Ideal Body Weight (kg)                      64.54


     BMI                                         27.7


     Intake Prior to Admission                   Good


     Weight Status                               Overweight


     Subjective/Other Information                RN screen for MST. Pt stated


                                                 his UBW is 145 lbs and has not


                                                 noticed any wt loss. Pt


                                                 stated he was eating well PTA


                                                 and consumed 75% of breakfast


                                                 tray.  Pt stated he has a good


                                                 appetite and has not had any


                                                 N/V. Noted slight orbital


                                                 wasting.


     Burn                                        Absent


     Trauma                                      Absent


     GI Symptoms                                 None


     Food Allergy                                No


     Current % PO                                Good (%)


     Minimum of two criteria                     No


     Body Fat Depletion                          Mild depletion (non-severe)


     #1


      Nutrition Diagnosis                        Increased nutrient needs   (


                                                 specify in comment below),No


                                                 nutrition diagnosis at this


                                                 time


      Comments:                                  Protein


      Etiology                                   wound healing


      As Evidenced by Signs and Symptoms         sacral and back wounds


     Is patient on ventilator?                   No


     Is Patient Ambulatory and/or Out of Bed     No


     REE-([facility]-confined to bed)      [phone]


     Calculation Used for Recommendations        [facility]


     Additional Notes                            Protein needs:  g/day (1


                                                 .25-1.5 g/kg/day)


                                                 Fluid needs: 1 ml/kcal or per


                                                 MD


 Nutrition Intervention


     Change Diet Order:                          Change to Renal diet


     Add Supplement/Snack (indicate name/kcal    Add Landon BID


      /protein )                                 


     Provides kCal:                              190


     Provides Protein (gm)                       5


     Goal #1                                     Meet >75% of energy and


                                                 protein needs


     Anticipated Discharge Needs:                Renal Diet


     Follow-Up By:                               02/11/20


     Additional Comments                         Follow up for PO intakes and


                                                 ONS need

## 2020-02-08 NOTE — HISTORY AND PHYSICAL REPORT
History of Present Illness


Date of examination: 02/08/20


Date of admission: 


2/08/20


Chief complaint: 


" Increased confusion"





History of present illness: 


Patient is a 86-year-old male with a past medical history of dementia, CVA, CKD 

and , hypertension who presents to  ER with complaints of increased confusion.

 Per EMS, patient had skin wounds to buttocks, cough and fever x3 days.  Upon 

arrival code sepsis was activated, on assessment patient is pleasantly confused,

easily aroused and appears in no distress. 








Past History


Past Medical History: other (As noted in HPI)


Past Surgical History: Other (Unable to obtain)


Social history: other (Unable to obtain)


Family history: other (Unable to obtain)





Medications and Allergies


                                    Allergies











Allergy/AdvReac Type Severity Reaction Status Date / Time


 


No Known Allergies Allergy   Verified 06/06/19 18:56











                                Home Medications











 Medication  Instructions  Recorded  Confirmed  Last Taken  Type


 


levETIRAcetam [Keppra TAB] 250 mg PO BID #60 tablet 06/06/19 02/08/20 Unknown Rx


 


Aspirin 325 mg PO ONCE 02/08/20 02/08/20 Unknown History


 


Doxepin [SINEquan] 10 mg PO QHS 02/08/20 02/08/20 Unknown History


 


Loratadine 10 mg PO DAILY 02/08/20 02/08/20 Unknown History


 


Sennosides/Docusate [Senokot S] 2 each PO QHS 02/08/20 02/08/20 Unknown History


 


amLODIPine [Norvasc] 5 mg PO DAILY 02/08/20 02/08/20 Unknown History


 


hydrOXYzine HCL [Atarax] 25 mg PO TID 02/08/20 02/08/20 Unknown History


 


lisinopriL [Zestril] 20 mg PO QDAY 02/08/20 02/08/20 Unknown History











Active Meds: 


Active Medications





Vancomycin HCl 2,000 mg/ (Sodium Chloride)  540 mls @ 250 mls/hr IV ONCE ONE


   Stop: 02/08/20 01:39


   Last Admin: 02/08/20 00:24 Dose:  250 mls/hr


   Documented by: 











Review of Systems


ROS unobtainable: due to mental status


Constitutional: fever





Exam





- Physical Exam


Narrative exam: 


- Physical Exam


Narrative exam: 


General appearance: Present: No distress noted





- EENT


Eyes: Present: PERRL


ENT: hearing intact, clear oral mucosa





- Neck


Neck: Present: supple, normal ROM





- Respiratory


Respiratory effort: normal


Respiratory: bilateral: Clear to auscultation





- Cardiovascular


Heart Sounds: Present: S1 & S2.  Absent: rub, click





- Extremities


Extremities: pulses symmetrical, No edema


Peripheral Pulses: within normal limits





- Abdominal


General gastrointestinal: Present: , non-distended, normal bowel sounds


genitourinary: Present: normal





- Integumentary


Integumentary: Present: large cellulitis noted to the right buttock





- Musculoskeletal


Musculoskeletal: gait normal, strength equal bilaterally





- Psychiatric


Psychiatric: appropriate mood/affect





- Neurologic


Neurologic:, moves all extremities








- Constitutional


Vitals: 


                                        











Temp Pulse Resp BP Pulse Ox


 


 102.8 F H  108 H  18   131/67   99 


 


 02/07/20 22:55  02/07/20 22:45  02/07/20 22:45  02/07/20 22:45  02/07/20 22:45














Results





- Labs


CBC & Chem 7: 


                                 02/07/20 22:56





                                 02/07/20 22:56


Labs: 


                             Laboratory Last Values











WBC  16.1 K/mm3 (4.5-11.0)  H  02/07/20  22:56    


 


RBC  3.87 M/mm3 (3.65-5.03)   02/07/20  22:56    


 


Hgb  11.2 gm/dl (11.8-15.2)  L  02/07/20  22:56    


 


Hct  33.9 % (35.5-45.6)  L  02/07/20  22:56    


 


MCV  88 fl (84-94)   02/07/20  22:56    


 


MCH  29 pg (28-32)   02/07/20  22:56    


 


MCHC  33 % (32-34)   02/07/20  22:56    


 


RDW  13.9 % (13.2-15.2)   02/07/20  22:56    


 


Plt Count  618 K/mm3 (140-440)  H  02/07/20  22:56    


 


Lymph % (Auto)  6.8 % (13.4-35.0)  L  02/07/20  22:56    


 


Mono % (Auto)  10.9 % (0.0-7.3)  H  02/07/20  22:56    


 


Eos % (Auto)  9.2 % (0.0-4.3)  H  02/07/20  22:56    


 


Baso % (Auto)  1.1 % (0.0-1.8)   02/07/20  22:56    


 


Lymph #  1.1 K/mm3 (1.2-5.4)  L  02/07/20  22:56    


 


Mono #  1.8 K/mm3 (0.0-0.8)  H  02/07/20  22:56    


 


Eos #  1.5 K/mm3 (0.0-0.4)  H  02/07/20  22:56    


 


Baso #  0.2 K/mm3 (0.0-0.1)  H  02/07/20  22:56    


 


Seg Neutrophils %  72.0 % (40.0-70.0)  H  02/07/20  22:56    


 


Seg Neutrophils #  11.6 K/mm3 (1.8-7.7)  H  02/07/20  22:56    


 


Sodium  147 mmol/L (137-145)  H  02/07/20  22:56    


 


Potassium  4.2 mmol/L (3.6-5.0)   02/07/20  22:56    


 


Chloride  114.9 mmol/L ()  H  02/07/20  22:56    


 


Carbon Dioxide  20 mmol/L (22-30)  L  02/07/20  22:56    


 


Anion Gap  16 mmol/L  02/07/20  22:56    


 


BUN  20 mg/dL (9-20)   02/07/20  22:56    


 


Creatinine  2.6 mg/dL (0.8-1.5)  H  02/07/20  22:56    


 


Estimated GFR  28 ml/min  02/07/20  22:56    


 


BUN/Creatinine Ratio  8 %  02/07/20  22:56    


 


Glucose  110 mg/dL ()  H  02/07/20  22:56    


 


Lactic Acid  1.10 mmol/L (0.7-2.0)   02/07/20  22:56    


 


Calcium  8.4 mg/dL (8.4-10.2)   02/07/20  22:56    


 


Total Bilirubin  0.20 mg/dL (0.1-1.2)   02/07/20  22:56    


 


AST  15 units/L (5-40)   02/07/20  22:56    


 


ALT  7 units/L (7-56)   02/07/20  22:56    


 


Alkaline Phosphatase  69 units/L ()   02/07/20  22:56    


 


Troponin T  0.021 ng/mL (0.00-0.029)   02/07/20  22:56    


 


Total Protein  6.9 g/dL (6.3-8.2)   02/07/20  22:56    


 


Albumin  2.4 g/dL (3.9-5)  L  02/07/20  22:56    


 


Albumin/Globulin Ratio  0.5 %  02/07/20  22:56    


 


Urine Color  Yellow  (Yellow)   02/07/20  Unknown


 


Urine Turbidity  Slightly-cloudy  (Clear)   02/07/20  Unknown


 


Urine pH  5.0  (5.0-7.0)   02/07/20  Unknown


 


Ur Specific Gravity  1.013  (1.003-1.030)   02/07/20  Unknown


 


Urine Protein  <15 mg/dl mg/dL (Negative)   02/07/20  Unknown


 


Urine Glucose (UA)  Neg mg/dL (Negative)   02/07/20  Unknown


 


Urine Ketones  Neg mg/dL (Negative)   02/07/20  Unknown


 


Urine Blood  Sm  (Negative)   02/07/20  Unknown


 


Urine Nitrite  Neg  (Negative)   02/07/20  Unknown


 


Urine Bilirubin  Neg  (Negative)   02/07/20  Unknown


 


Urine Urobilinogen  < 2.0 mg/dL (<2.0)   02/07/20  Unknown


 


Ur Leukocyte Esterase  Sm  (Negative)   02/07/20  Unknown


 


Urine WBC (Auto)  19.0 /HPF (0.0-6.0)  H  02/07/20  Unknown


 


Urine RBC (Auto)  4.0 /HPF (0.0-6.0)   02/07/20  Unknown


 


U Epithel Cells (Auto)  < 1.0 /HPF (0-13.0)   02/07/20  Unknown


 


Urine Bacteria (Auto)  1+ /HPF (Negative)   02/07/20  Unknown


 


Hyaline Casts  1 /LPF  02/07/20  Unknown


 


Urine Mucus  Few /HPF  02/07/20  Unknown














- Imaging and Cardiology


EKG: report reviewed (Sinus tach)





Assessment and Plan


Assessment and plan: 


Patient seen in conjunction with Dr. Villalba, who agrees with plan of care.





Acute metabolic encephalopathy


-Multifactorial, sepsis, UTI, cellulitis


-Blood cultures pending


-neuro checks


-Continue supportive care





Sepsis


-Leukocytosis 16.1


-Tachycardic with heart rate 105 bpm


-On IV Abx


-Cultures pending


-Continue supportive care





CKD


-Avoid nephrotoxic agents


-Renal dose all meds


-strict I/O,


-monitor uop q shift,





Acute cystitis


-IV antibiotics continued


-Culture pending


-IV fluids





Dementia


-Reorient as needed


-Supportive care


-Continue home meds once reconciled





Cellulitis


-site, buttock


-Wound care consult


-On IV ABX





 DVT prophylaxis


-SCDs bilateral extremities


-Heparin subcu











Advance Directives: No


VTE prophylaxis?: Chemical

## 2020-02-09 RX ADMIN — Medication SCH ML: at 09:32

## 2020-02-09 RX ADMIN — HEPARIN SODIUM SCH UNIT: 5000 INJECTION, SOLUTION INTRAVENOUS; SUBCUTANEOUS at 09:31

## 2020-02-09 RX ADMIN — HEPARIN SODIUM SCH UNIT: 5000 INJECTION, SOLUTION INTRAVENOUS; SUBCUTANEOUS at 21:49

## 2020-02-09 RX ADMIN — CEFEPIME SCH MLS/HR: 2 INJECTION, POWDER, FOR SOLUTION INTRAVENOUS at 09:32

## 2020-02-09 RX ADMIN — SODIUM CHLORIDE SCH MLS/HR: 0.9 INJECTION, SOLUTION INTRAVENOUS at 23:28

## 2020-02-09 RX ADMIN — ACETAMINOPHEN PRN MG: 325 TABLET ORAL at 17:13

## 2020-02-09 RX ADMIN — Medication SCH ML: at 21:49

## 2020-02-09 NOTE — PROGRESS NOTE
Assessment and Plan


Assessment and plan: 





Toxic metabolic encephalopathy


-Multifactorial, sepsis, UTI, cellulitis


-Blood cultures pending


-neuro checks


-Continue supportive care





Sepsis


-Follow-up CBC


-Continue IV Abx


-Cultures pending


-Continue supportive care





CKD


-Avoid nephrotoxic agents


-Renal dose all meds


-strict I/O,


-monitor uop q shift,





Acute cystitis


-IV antibiotics continued


-Culture pending


-IV fluids





Dementia


-Reorient as needed


-Supportive care


-Continue home meds once reconciled





Cellulitis


-site, buttock


-Wound care consult


-On IV ABX





 DVT prophylaxis


-SCDs bilateral extremities


-Heparin subcu





History


Interval history: 





No new issues overnight.





Hospitalist Physical





- Constitutional


Vitals: 


                                        











Temp Pulse Resp BP Pulse Ox


 


 98.7 F   100 H  18   138/81   99 


 


 02/09/20 07:33  02/09/20 07:33  02/09/20 07:33  02/09/20 07:33  02/09/20 07:33











General appearance: Present: no acute distress, well-nourished





- EENT


Eyes: Present: PERRL, EOM intact


ENT: hearing intact, clear oral mucosa, dentition normal





- Neck


Neck: Present: supple, normal ROM





- Respiratory


Respiratory effort: normal


Respiratory: bilateral: CTA





- Cardiovascular


Rhythm: regular


Heart Sounds: Present: S1 & S2.  Absent: gallop, rub





- Extremities


Extremities: no ischemia, No edema, Full ROM





- Abdominal


General gastrointestinal: soft, non-tender, non-distended, normal bowel sounds





- Integumentary


Integumentary: Present: clear, warm, dry





- Neurologic


Neurologic: CNII-XII intact, moves all extremities





Results





- Labs


CBC & Chem 7: 


                                 02/07/20 22:56





                                 02/07/20 22:56


Labs: 


                             Laboratory Last Values











WBC  16.1 K/mm3 (4.5-11.0)  H  02/07/20  22:56    


 


RBC  3.87 M/mm3 (3.65-5.03)   02/07/20  22:56    


 


Hgb  11.2 gm/dl (11.8-15.2)  L  02/07/20  22:56    


 


Hct  33.9 % (35.5-45.6)  L  02/07/20  22:56    


 


MCV  88 fl (84-94)   02/07/20  22:56    


 


MCH  29 pg (28-32)   02/07/20  22:56    


 


MCHC  33 % (32-34)   02/07/20  22:56    


 


RDW  13.9 % (13.2-15.2)   02/07/20  22:56    


 


Plt Count  618 K/mm3 (140-440)  H  02/07/20  22:56    


 


Lymph % (Auto)  6.8 % (13.4-35.0)  L  02/07/20  22:56    


 


Mono % (Auto)  10.9 % (0.0-7.3)  H  02/07/20  22:56    


 


Eos % (Auto)  9.2 % (0.0-4.3)  H  02/07/20  22:56    


 


Baso % (Auto)  1.1 % (0.0-1.8)   02/07/20  22:56    


 


Lymph #  1.1 K/mm3 (1.2-5.4)  L  02/07/20  22:56    


 


Mono #  1.8 K/mm3 (0.0-0.8)  H  02/07/20  22:56    


 


Eos #  1.5 K/mm3 (0.0-0.4)  H  02/07/20  22:56    


 


Baso #  0.2 K/mm3 (0.0-0.1)  H  02/07/20  22:56    


 


Seg Neutrophils %  72.0 % (40.0-70.0)  H  02/07/20  22:56    


 


Seg Neutrophils #  11.6 K/mm3 (1.8-7.7)  H  02/07/20  22:56    


 


Sodium  147 mmol/L (137-145)  H  02/07/20  22:56    


 


Potassium  4.2 mmol/L (3.6-5.0)   02/07/20  22:56    


 


Chloride  114.9 mmol/L ()  H  02/07/20  22:56    


 


Carbon Dioxide  20 mmol/L (22-30)  L  02/07/20  22:56    


 


Anion Gap  16 mmol/L  02/07/20  22:56    


 


BUN  20 mg/dL (9-20)   02/07/20  22:56    


 


Creatinine  2.6 mg/dL (0.8-1.5)  H  02/07/20  22:56    


 


Estimated GFR  28 ml/min  02/07/20  22:56    


 


BUN/Creatinine Ratio  8 %  02/07/20  22:56    


 


Glucose  110 mg/dL ()  H  02/07/20  22:56    


 


Lactic Acid  1.20 mmol/L (0.7-2.0)   02/08/20  09:24    


 


Calcium  8.4 mg/dL (8.4-10.2)   02/07/20  22:56    


 


Total Bilirubin  0.20 mg/dL (0.1-1.2)   02/07/20  22:56    


 


AST  15 units/L (5-40)   02/07/20  22:56    


 


ALT  7 units/L (7-56)   02/07/20  22:56    


 


Alkaline Phosphatase  69 units/L ()   02/07/20  22:56    


 


Troponin T  0.021 ng/mL (0.00-0.029)   02/07/20  22:56    


 


Total Protein  6.9 g/dL (6.3-8.2)   02/07/20  22:56    


 


Albumin  2.4 g/dL (3.9-5)  L  02/07/20  22:56    


 


Albumin/Globulin Ratio  0.5 %  02/07/20  22:56    


 


Urine Color  Yellow  (Yellow)   02/07/20  Unknown


 


Urine Turbidity  Slightly-cloudy  (Clear)   02/07/20  Unknown


 


Urine pH  5.0  (5.0-7.0)   02/07/20  Unknown


 


Ur Specific Gravity  1.013  (1.003-1.030)   02/07/20  Unknown


 


Urine Protein  <15 mg/dl mg/dL (Negative)   02/07/20  Unknown


 


Urine Glucose (UA)  Neg mg/dL (Negative)   02/07/20  Unknown


 


Urine Ketones  Neg mg/dL (Negative)   02/07/20  Unknown


 


Urine Blood  Sm  (Negative)   02/07/20  Unknown


 


Urine Nitrite  Neg  (Negative)   02/07/20  Unknown


 


Urine Bilirubin  Neg  (Negative)   02/07/20  Unknown


 


Urine Urobilinogen  < 2.0 mg/dL (<2.0)   02/07/20  Unknown


 


Ur Leukocyte Esterase  Sm  (Negative)   02/07/20  Unknown


 


Urine WBC (Auto)  19.0 /HPF (0.0-6.0)  H  02/07/20  Unknown


 


Urine RBC (Auto)  4.0 /HPF (0.0-6.0)   02/07/20  Unknown


 


U Epithel Cells (Auto)  < 1.0 /HPF (0-13.0)   02/07/20  Unknown


 


Urine Bacteria (Auto)  1+ /HPF (Negative)   02/07/20  Unknown


 


Hyaline Casts  1 /LPF  02/07/20  Unknown


 


Urine Mucus  Few /HPF  02/07/20  Unknown


 


Blood Type  O POSITIVE   02/08/20  02:30    


 


Antibody Screen  Negative   02/08/20  02:30    














Active Medications





- Current Medications


Current Medications: 














Generic Name Dose Route Start Last Admin





  Trade Name Freq  PRN Reason Stop Dose Admin


 


Acetaminophen  650 mg  02/08/20 00:43  02/08/20 19:46





  Tylenol  PO   650 mg





  Q4H PRN   Administration





  Pain MILD(1-3)/Fever >100.5/HA  


 


Diphenhydramine HCl  25 mg  02/08/20 18:44  02/09/20 09:32





  Benadryl  PO   25 mg





  Q6H PRN   Administration





  Itching  


 


Heparin Sodium (Porcine)  5,000 unit  02/08/20 10:00  02/09/20 09:31





  Heparin  SUB-Q   5,000 unit





  Q12HR DACIA   Administration


 


Cefepime HCl  2 gm in 100 mls @ 200 mls/hr  02/08/20 10:00  02/09/20 09:32





  Cefepime/Ns 2 Gm/100 Ml  IV   200 mls/hr





  Q24HR DACIA   Administration





  Protocol  


 


Sodium Chloride  1,000 mls @ 75 mls/hr  02/08/20 04:30  02/08/20 20:40





  Nacl 0.9% 1000 Ml  IV   75 mls/hr





  AS DIRECT DACIA   Administration


 


Ondansetron HCl  4 mg  02/08/20 00:43 





  Zofran  IV  





  Q8H PRN  





  Nausea And Vomiting  


 


Sodium Chloride  10 ml  02/08/20 10:00  02/09/20 09:32





  Sodium Chloride Flush Syringe 10 Ml  IV   10 ml





  BID DACIA   Administration


 


Sodium Chloride  10 ml  02/08/20 00:43 





  Sodium Chloride Flush Syringe 10 Ml  IV  





  PRN PRN  





  LINE FLUSH  














Nutrition/Malnutrition Assess





- Dietary Evaluation


Nutrition/Malnutrition Findings: 


                                        





Nutrition Notes                                            Start:  02/08/20 

11:12


Freq:                                                      Status: Active       




Protocol:                                                                       




 Document     02/08/20 11:12  CT  (Rec: 02/08/20 11:19  CT  [id number])


 Co-Sign      02/08/20 11:12  LP


 Nutrition Notes


     Need for Assessment generated from:         RN Referral,MST


     Initial or Follow up                        Assessment


     Current Diagnosis                           CKD(stage I-IV),Sepsis,


                                                 Hypertension,Stroke


     Other Pertinent Diagnosis                   AMS, dementia, Acute Metabolic


                                                 Encephalopathy, wounds


     Current Diet                                Cardiac Diet


     Labs/Tests                                  Na 147


                                                 Cr 2.6


                                                 Glu 110


     Pertinent Medications                       NS 75 ml/hr


     Height                                      5 ft 6 in


     Weight                                      78 kg


     Usual Body Weight                           65.771 kg


     Ideal Body Weight (kg)                      64.54


     BMI                                         27.7


     Intake Prior to Admission                   Good


     Weight Status                               Overweight


     Subjective/Other Information                RN screen for MST. Pt stated


                                                 his UBW is 145 lbs and has not


                                                 noticed any wt loss. Pt


                                                 stated he was eating well PTA


                                                 and consumed 75% of breakfast


                                                 tray.  Pt stated he has a good


                                                 appetite and has not had any


                                                 N/V. Noted slight orbital


                                                 wasting.


     Burn                                        Absent


     Trauma                                      Absent


     GI Symptoms                                 None


     Food Allergy                                No


     Current % PO                                Good (%)


     Minimum of two criteria                     No


     Body Fat Depletion                          Mild depletion (non-severe)


     #1


      Nutrition Diagnosis                        Increased nutrient needs   (


                                                 specify in comment below),No


                                                 nutrition diagnosis at this


                                                 time


      Comments:                                  Protein


      Etiology                                   wound healing


      As Evidenced by Signs and Symptoms         sacral and back wounds


     Is patient on ventilator?                   No


     Is Patient Ambulatory and/or Out of Bed     No


     REE-(Pierce-St. Jeor-confined to bed)      [phone]


     [facility] Recommendations        [facility]


     Additional Notes                            Protein needs:  g/day (0


                                                 .8-1.5 g/kg/day) CKD and wound


                                                 healing


                                                 Fluid needs: 1 ml/kcal or per


                                                 MD


 Nutrition Intervention


     Change Diet Order:                          Change to Renal diet


     Add Supplement/Snack (indicate name/kcal    Add Landon BID


      /protein )                                 


     Provides kCal:                              190


     Provides Protein (gm)                       5


     Goal #1                                     Meet >75% of energy and


                                                 protein needs


     Anticipated Discharge Needs:                Renal Diet


     Follow-Up By:                               02/11/20


     Additional Comments                         Follow up for PO intakes and


                                                 ONS need

## 2020-02-10 LAB
BAND NEUTROPHILS # (MANUAL): 0.4 K/MM3
BUN SERPL-MCNC: 30 MG/DL (ref 9–20)
BUN/CREAT SERPL: 16 %
CALCIUM SERPL-MCNC: 8.1 MG/DL (ref 8.4–10.2)
HCT VFR BLD CALC: 33.8 % (ref 35.5–45.6)
HEMOLYSIS INDEX: 34
HGB BLD-MCNC: 10.5 GM/DL (ref 11.8–15.2)
MCHC RBC AUTO-ENTMCNC: 31 % (ref 32–34)
MCV RBC AUTO: 91 FL (ref 84–94)
MYELOCYTES # (MANUAL): 0 K/MM3
PLATELET # BLD: 482 K/MM3 (ref 140–440)
PROMYELOCYTES # (MANUAL): 0 K/MM3
RBC # BLD AUTO: 3.7 M/MM3 (ref 3.65–5.03)
TOTAL CELLS COUNTED BLD: 100

## 2020-02-10 RX ADMIN — Medication SCH ML: at 21:19

## 2020-02-10 RX ADMIN — HEPARIN SODIUM SCH UNIT: 5000 INJECTION, SOLUTION INTRAVENOUS; SUBCUTANEOUS at 10:18

## 2020-02-10 RX ADMIN — CEFEPIME SCH MLS/HR: 2 INJECTION, POWDER, FOR SOLUTION INTRAVENOUS at 10:17

## 2020-02-10 RX ADMIN — HEPARIN SODIUM SCH UNIT: 5000 INJECTION, SOLUTION INTRAVENOUS; SUBCUTANEOUS at 21:18

## 2020-02-10 RX ADMIN — TRIAMCINOLONE ACETONIDE SCH APPLIC: 1 CREAM TOPICAL at 21:18

## 2020-02-10 RX ADMIN — Medication SCH ML: at 12:21

## 2020-02-10 RX ADMIN — DEXTROSE SCH MLS/HR: 5 SOLUTION INTRAVENOUS at 10:18

## 2020-02-10 NOTE — PROGRESS NOTE
Assessment and Plan


Assessment and plan: 





Toxic metabolic encephalopathy


-Multifactorial, sepsis, UTI, cellulitis


-Blood cultures pending


-neuro checks


-Continue supportive care





Sepsis


-Persistent leukocytosis


-Consider ID consultation


-Continue IV Abx


-Cultures pending


-Continue supportive care





CKD


-Patient at baseline.  Creatinine June 2019 1.9.


-Avoid nephrotoxic agents


-Renal dose all meds


-strict I/O,


-monitor uop q shift,





Hypernatremia


-Start D5W IV fluids





Acute cystitis


-IV antibiotics continued


-Culture pending


-IV fluids





Dementia


-Reorient as needed


-Supportive care


-Continue home meds once reconciled





Cellulitis


-site, buttock


-Wound care consult


-On IV ABX





 DVT prophylaxis


-SCDs bilateral extremities


-Heparin subcu





History


Interval history: 





No new issues overnight.





Hospitalist Physical





- Constitutional


Vitals: 


                                        











Temp Pulse Resp BP Pulse Ox


 


 98.1 F   91 H  18   114/50   96 


 


 02/10/20 07:41  02/10/20 07:41  02/10/20 07:41  02/10/20 07:41  02/10/20 07:41











General appearance: Present: no acute distress, well-nourished





- EENT


Eyes: Present: PERRL, EOM intact


ENT: hearing intact, clear oral mucosa, dentition normal





- Neck


Neck: Present: supple, normal ROM





- Respiratory


Respiratory effort: normal


Respiratory: bilateral: CTA





- Cardiovascular


Rhythm: regular


Heart Sounds: Present: S1 & S2.  Absent: gallop, rub





- Extremities


Extremities: no ischemia, No edema, Full ROM





- Abdominal


General gastrointestinal: soft, non-tender, non-distended, normal bowel sounds





- Integumentary


Integumentary: Present: clear, warm, dry





- Neurologic


Neurologic: CNII-XII intact, moves all extremities





Results





- Labs


CBC & Chem 7: 


                                 02/10/20 04:11





                                 02/10/20 04:11


Labs: 


                             Laboratory Last Values











WBC  17.5 K/mm3 (4.5-11.0)  H  02/10/20  04:11    


 


RBC  3.70 M/mm3 (3.65-5.03)   02/10/20  04:11    


 


Hgb  10.5 gm/dl (11.8-15.2)  L  02/10/20  04:11    


 


Hct  33.8 % (35.5-45.6)  L  02/10/20  04:11    


 


MCV  91 fl (84-94)   02/10/20  04:11    


 


MCH  28 pg (28-32)   02/10/20  04:11    


 


MCHC  31 % (32-34)  L  02/10/20  04:11    


 


RDW  14.4 % (13.2-15.2)   02/10/20  04:11    


 


Plt Count  482 K/mm3 (140-440)  H  02/10/20  04:11    


 


Lymph % (Auto)  6.8 % (13.4-35.0)  L  02/07/20  22:56    


 


Mono % (Auto)  10.9 % (0.0-7.3)  H  02/07/20  22:56    


 


Eos % (Auto)  9.2 % (0.0-4.3)  H  02/07/20  22:56    


 


Baso % (Auto)  1.1 % (0.0-1.8)   02/07/20  22:56    


 


Lymph #  1.1 K/mm3 (1.2-5.4)  L  02/07/20  22:56    


 


Mono #  1.8 K/mm3 (0.0-0.8)  H  02/07/20  22:56    


 


Eos #  1.5 K/mm3 (0.0-0.4)  H  02/07/20  22:56    


 


Baso #  0.2 K/mm3 (0.0-0.1)  H  02/07/20  22:56    


 


Add Manual Diff  Complete   02/10/20  04:11    


 


Total Counted  100   02/10/20  04:11    


 


Seg Neutrophils %  72.0 % (40.0-70.0)  H  02/07/20  22:56    


 


Seg Neuts % (Manual)  76.0 % (40.0-70.0)  H  02/10/20  04:11    


 


Band Neutrophils %  2.0 %  02/10/20  04:11    


 


Lymphocytes % (Manual)  6.0 % (13.4-35.0)  L  02/10/20  04:11    


 


Reactive Lymphs % (Man)  0 %  02/10/20  04:11    


 


Monocytes % (Manual)  8.0 % (0.0-7.3)  H  02/10/20  04:11    


 


Eosinophils % (Manual)  6.0 % (0.0-4.3)  H  02/10/20  04:11    


 


Basophils % (Manual)  0 % (0.0-1.8)   02/10/20  04:11    


 


Metamyelocytes %  2.0 %  02/10/20  04:11    


 


Myelocytes %  0 %  02/10/20  04:11    


 


Promyelocytes %  0 %  02/10/20  04:11    


 


Blast Cells %  0 %  02/10/20  04:11    


 


Nucleated RBC %  Not Reportable   02/10/20  04:11    


 


Seg Neutrophils #  11.6 K/mm3 (1.8-7.7)  H  02/07/20  22:56    


 


Seg Neutrophils # Man  13.3 K/mm3 (1.8-7.7)  H  02/10/20  04:11    


 


Band Neutrophils #  0.4 K/mm3  02/10/20  04:11    


 


Lymphocytes # (Manual)  1.1 K/mm3 (1.2-5.4)  L  02/10/20  04:11    


 


Abs React Lymphs (Man)  0.0 K/mm3  02/10/20  04:11    


 


Monocytes # (Manual)  1.4 K/mm3 (0.0-0.8)  H  02/10/20  04:11    


 


Eosinophils # (Manual)  1.1 K/mm3 (0.0-0.4)  H  02/10/20  04:11    


 


Basophils # (Manual)  0.0 K/mm3 (0.0-0.1)   02/10/20  04:11    


 


Metamyelocytes #  0.4 K/mm3  02/10/20  04:11    


 


Myelocytes #  0.0 K/mm3  02/10/20  04:11    


 


Promyelocytes #  0.0 K/mm3  02/10/20  04:11    


 


Blast Cells #  0.0 K/mm3  02/10/20  04:11    


 


WBC Morphology  Not Reportable   02/10/20  04:11    


 


Hypersegmented Neuts  Not Reportable   02/10/20  04:11    


 


Hyposegmented Neuts  Not Reportable   02/10/20  04:11    


 


Hypogranular Neuts  Not Reportable   02/10/20  04:11    


 


Smudge Cells  Not Reportable   02/10/20  04:11    


 


Toxic Granulation  Not Reportable   02/10/20  04:11    


 


Toxic Vacuolation  Not Reportable   02/10/20  04:11    


 


Dohle Bodies  Not Reportable   02/10/20  04:11    


 


Pelger-Huet Anomaly  Not Reportable   02/10/20  04:11    


 


Bienvenido Rods  Not Reportable   02/10/20  04:11    


 


Platelet Estimate  Consistent w auto   02/10/20  04:11    


 


Clumped Platelets  Not Reportable   02/10/20  04:11    


 


Plt Clumps, EDTA  Not Reportable   02/10/20  04:11    


 


Large Platelets  Not Reportable   02/10/20  04:11    


 


Giant Platelets  Not Reportable   02/10/20  04:11    


 


Platelet Satelliting  Not Reportable   02/10/20  04:11    


 


Plt Morphology Comment  Not Reportable   02/10/20  04:11    


 


RBC Morphology  Not Reportable   02/10/20  04:11    


 


Dimorphic RBCs  Not Reportable   02/10/20  04:11    


 


Polychromasia  Not Reportable   02/10/20  04:11    


 


Hypochromasia  Not Reportable   02/10/20  04:11    


 


Poikilocytosis  Not Reportable   02/10/20  04:11    


 


Anisocytosis  Not Reportable   02/10/20  04:11    


 


Microcytosis  Not Reportable   02/10/20  04:11    


 


Macrocytosis  Not Reportable   02/10/20  04:11    


 


Spherocytes  Not Reportable   02/10/20  04:11    


 


Pappenheimer Bodies  Not Reportable   02/10/20  04:11    


 


Sickle Cells  Not Reportable   02/10/20  04:11    


 


Target Cells  Not Reportable   02/10/20  04:11    


 


Tear Drop Cells  Not Reportable   02/10/20  04:11    


 


Ovalocytes  Not Reportable   02/10/20  04:11    


 


Helmet Cells  Not Reportable   02/10/20  04:11    


 


Gross-Corley Bodies  Not Reportable   02/10/20  04:11    


 


Cabot Rings  Not Reportable   02/10/20  04:11    


 


Sanford Cells  Rare   02/10/20  04:11    


 


Bite Cells  Not Reportable   02/10/20  04:11    


 


Crenated Cell  Not Reportable   02/10/20  04:11    


 


Elliptocytes  Not Reportable   02/10/20  04:11    


 


Acanthocytes (Spur)  Not Reportable   02/10/20  04:11    


 


Rouleaux  Not Reportable   02/10/20  04:11    


 


Hemoglobin C Crystals  Not Reportable   02/10/20  04:11    


 


Schistocytes  Not Reportable   02/10/20  04:11    


 


Malaria parasites  Not Reportable   02/10/20  04:11    


 


Parker Bodies  Not Reportable   02/10/20  04:11    


 


Hem Pathologist Commnt  No   02/10/20  04:11    


 


Sodium  152 mmol/L (137-145)  H  02/10/20  04:11    


 


Potassium  4.3 mmol/L (3.6-5.0)   02/10/20  04:11    


 


Chloride  124.5 mmol/L ()  H  02/10/20  04:11    


 


Carbon Dioxide  17 mmol/L (22-30)  L  02/10/20  04:11    


 


Anion Gap  15 mmol/L  02/10/20  04:11    


 


BUN  30 mg/dL (9-20)  H  02/10/20  04:11    


 


Creatinine  1.9 mg/dL (0.8-1.5)  H  02/10/20  04:11    


 


Estimated GFR  41 ml/min  02/10/20  04:11    


 


BUN/Creatinine Ratio  16 %  02/10/20  04:11    


 


Glucose  95 mg/dL ()   02/10/20  04:11    


 


Lactic Acid  1.20 mmol/L (0.7-2.0)   02/08/20  09:24    


 


Calcium  8.1 mg/dL (8.4-10.2)  L  02/10/20  04:11    


 


Total Bilirubin  0.20 mg/dL (0.1-1.2)   02/07/20  22:56    


 


AST  15 units/L (5-40)   02/07/20  22:56    


 


ALT  7 units/L (7-56)   02/07/20  22:56    


 


Alkaline Phosphatase  69 units/L ()   02/07/20  22:56    


 


Troponin T  0.021 ng/mL (0.00-0.029)   02/07/20  22:56    


 


Total Protein  6.9 g/dL (6.3-8.2)   02/07/20  22:56    


 


Albumin  2.4 g/dL (3.9-5)  L  02/07/20  22:56    


 


Albumin/Globulin Ratio  0.5 %  02/07/20  22:56    


 


Urine Color  Yellow  (Yellow)   02/07/20  Unknown


 


Urine Turbidity  Slightly-cloudy  (Clear)   02/07/20  Unknown


 


Urine pH  5.0  (5.0-7.0)   02/07/20  Unknown


 


Ur Specific Gravity  1.013  (1.003-1.030)   02/07/20  Unknown


 


Urine Protein  <15 mg/dl mg/dL (Negative)   02/07/20  Unknown


 


Urine Glucose (UA)  Neg mg/dL (Negative)   02/07/20  Unknown


 


Urine Ketones  Neg mg/dL (Negative)   02/07/20  Unknown


 


Urine Blood  Sm  (Negative)   02/07/20  Unknown


 


Urine Nitrite  Neg  (Negative)   02/07/20  Unknown


 


Urine Bilirubin  Neg  (Negative)   02/07/20  Unknown


 


Urine Urobilinogen  < 2.0 mg/dL (<2.0)   02/07/20  Unknown


 


Ur Leukocyte Esterase  Sm  (Negative)   02/07/20  Unknown


 


Urine WBC (Auto)  19.0 /HPF (0.0-6.0)  H  02/07/20  Unknown


 


Urine RBC (Auto)  4.0 /HPF (0.0-6.0)   02/07/20  Unknown


 


U Epithel Cells (Auto)  < 1.0 /HPF (0-13.0)   02/07/20  Unknown


 


Urine Bacteria (Auto)  1+ /HPF (Negative)   02/07/20  Unknown


 


Hyaline Casts  1 /LPF  02/07/20  Unknown


 


Urine Mucus  Few /HPF  02/07/20  Unknown


 


Blood Type  O POSITIVE   02/08/20  02:30    


 


Antibody Screen  Negative   02/08/20  02:30    














Active Medications





- Current Medications


Current Medications: 














Generic Name Dose Route Start Last Admin





  Trade Name Freq  PRN Reason Stop Dose Admin


 


Acetaminophen  650 mg  02/08/20 00:43  02/09/20 17:13





  Tylenol  PO   650 mg





  Q4H PRN   Administration





  Pain MILD(1-3)/Fever >100.5/HA  


 


Diphenhydramine HCl  25 mg  02/08/20 18:44  02/10/20 01:46





  Benadryl  PO   25 mg





  Q6H PRN   Administration





  Itching  


 


Heparin Sodium (Porcine)  5,000 unit  02/08/20 10:00  02/09/20 21:49





  Heparin  SUB-Q   5,000 unit





  Q12HR DACIA   Administration


 


Cefepime HCl  2 gm in 100 mls @ 200 mls/hr  02/08/20 10:00  02/09/20 09:32





  Cefepime/Ns 2 Gm/100 Ml  IV   200 mls/hr





  Q24HR DACIA   Administration





  Protocol  


 


Sodium Chloride  1,000 mls @ 75 mls/hr  02/08/20 04:30  02/09/20 23:28





  Nacl 0.9% 1000 Ml  IV   75 mls/hr





  AS DIRECT DACIA   Administration


 


Ondansetron HCl  4 mg  02/08/20 00:43 





  Zofran  IV  





  Q8H PRN  





  Nausea And Vomiting  


 


Sodium Chloride  10 ml  02/08/20 10:00  02/09/20 21:49





  Sodium Chloride Flush Syringe 10 Ml  IV   10 ml





  BID DACIA   Administration


 


Sodium Chloride  10 ml  02/08/20 00:43 





  Sodium Chloride Flush Syringe 10 Ml  IV  





  PRN PRN  





  LINE FLUSH  














Nutrition/Malnutrition Assess





- Dietary Evaluation


Nutrition/Malnutrition Findings: 


                                        





Nutrition Notes                                            Start:  02/08/20 

11:12


Freq:                                                      Status: Active       




Protocol:                                                                       




 Document     02/08/20 11:12  CT  (Rec: 02/08/20 11:19  CT  [id number])


 Co-Sign      02/08/20 11:12  LP


 Nutrition Notes


     Need for Assessment generated from:         RN Referral,MST


     Initial or Follow up                        Assessment


     Current Diagnosis                           CKD(stage I-IV),Sepsis,


                                                 Hypertension,Stroke


     Other Pertinent Diagnosis                   AMS, dementia, Acute Metabolic


                                                 Encephalopathy, wounds


     Current Diet                                Cardiac Diet


     Labs/Tests                                  Na 147


                                                 Cr 2.6


                                                 Glu 110


     Pertinent Medications                       NS 75 ml/hr


     Height                                      5 ft 6 in


     Weight                                      78 kg


     Usual Body Weight                           65.771 kg


     Ideal Body Weight (kg)                      64.54


     BMI                                         27.7


     Intake Prior to Admission                   Good


     Weight Status                               Overweight


     Subjective/Other Information                RN screen for MST. Pt stated


                                                 his UBW is 145 lbs and has not


                                                 noticed any wt loss. Pt


                                                 stated he was eating well PTA


                                                 and consumed 75% of breakfast


                                                 tray.  Pt stated he has a good


                                                 appetite and has not had any


                                                 N/V. Noted slight orbital


                                                 wasting.


     Burn                                        Absent


     Trauma                                      Absent


     GI Symptoms                                 None


     Food Allergy                                No


     Current % PO                                Good (%)


     Minimum of two criteria                     No


     Body Fat Depletion                          Mild depletion (non-severe)


     #1


      Nutrition Diagnosis                        Increased nutrient needs   (


                                                 specify in comment below),No


                                                 nutrition diagnosis at this


                                                 time


      Comments:                                  Protein


      Etiology                                   wound healing


      As Evidenced by Signs and Symptoms         sacral and back wounds


     Is patient on ventilator?                   No


     Is Patient Ambulatory and/or Out of Bed     No


     REE-([facility]-confined to bed)      [phone]


     Calculation Used for Recommendations        [facility]


     Additional Notes                            Protein needs:  g/day (0


                                                 .8-1.5 g/kg/day) CKD and wound


                                                 healing


                                                 Fluid needs: 1 ml/kcal or per


                                                 MD


 Nutrition Intervention


     Change Diet Order:                          Change to Renal diet


     Add Supplement/Snack (indicate name/kcal    Add Landon BID


      /protein )                                 


     Provides kCal:                              190


     Provides Protein (gm)                       5


     Goal #1                                     Meet >75% of energy and


                                                 protein needs


     Anticipated Discharge Needs:                Renal Diet


     Follow-Up By:                               02/11/20


     Additional Comments                         Follow up for PO intakes and


                                                 ONS need

## 2020-02-11 LAB
BAND NEUTROPHILS # (MANUAL): 0 K/MM3
BUN SERPL-MCNC: 27 MG/DL (ref 9–20)
BUN/CREAT SERPL: 15 %
CALCIUM SERPL-MCNC: 8.4 MG/DL (ref 8.4–10.2)
HCT VFR BLD CALC: 31.5 % (ref 35.5–45.6)
HEMOLYSIS INDEX: 11
HGB BLD-MCNC: 10.2 GM/DL (ref 11.8–15.2)
MCHC RBC AUTO-ENTMCNC: 32 % (ref 32–34)
MCV RBC AUTO: 88 FL (ref 84–94)
MYELOCYTES # (MANUAL): 0 K/MM3
PLATELET # BLD: 522 K/MM3 (ref 140–440)
PROMYELOCYTES # (MANUAL): 0 K/MM3
RBC # BLD AUTO: 3.57 M/MM3 (ref 3.65–5.03)
TOTAL CELLS COUNTED BLD: 100

## 2020-02-11 RX ADMIN — Medication SCH ML: at 22:33

## 2020-02-11 RX ADMIN — TRIAMCINOLONE ACETONIDE SCH APPLIC: 1 CREAM TOPICAL at 09:51

## 2020-02-11 RX ADMIN — CEFEPIME SCH MLS/HR: 2 INJECTION, POWDER, FOR SOLUTION INTRAVENOUS at 09:48

## 2020-02-11 RX ADMIN — TRIAMCINOLONE ACETONIDE SCH APPLIC: 1 CREAM TOPICAL at 22:33

## 2020-02-11 RX ADMIN — ACETAMINOPHEN PRN MG: 325 TABLET ORAL at 22:32

## 2020-02-11 RX ADMIN — Medication SCH ML: at 09:51

## 2020-02-11 RX ADMIN — HEPARIN SODIUM SCH UNIT: 5000 INJECTION, SOLUTION INTRAVENOUS; SUBCUTANEOUS at 09:50

## 2020-02-11 RX ADMIN — HEPARIN SODIUM SCH UNIT: 5000 INJECTION, SOLUTION INTRAVENOUS; SUBCUTANEOUS at 22:33

## 2020-02-11 NOTE — PROGRESS NOTE
Assessment and Plan


Assessment and plan: 


Toxic metabolic encephalopathy


-Multifactorial, sepsis, UTI, cellulitis


-neuro checks


-Continue supportive care





Sepsis


-Persistent leukocytosis


-Consider ID consultation


-Continue IV Abx


-Cultures pending


-Continue supportive care





CKD


-Patient at baseline.  Creatinine June 2019 1.9.


-Avoid nephrotoxic agents


-Renal dose all meds


-strict I/O,


-monitor uop q shift,





Hypernatremia


-Start D5W IV fluids





Acute cystitis


-IV antibiotics continued


-Culture pending


-IV fluids





Vomiting


CXR done neg





Bilateral upper ext swelling


Get doppler Us





Dementia


-Reorient as needed


-Supportive care


-Continue home meds once reconciled





Cellulitis


-site, buttock


-Wound care consult


-On IV ABX





 DVT prophylaxis


-SCDs bilateral extremities


-Heparin subcutanous











History


Interval history: 


Vomited yesterday








Hospitalist Physical





- Physical exam


Narrative exam: 


GEN: Not in acute distress, lying in bed, 


HEENT: Normocephalic, atraumatic, 


Neck: supple, No JVD


Lungs: Clear to auscultation bilat, no wheeze,


heart;S1 and S2 reg, no murmurs, rubs or gallop


Abd:soft, non tender, normal bowel sounds


Ext: Swelling both upper ext, No edema, no clubbing, no cyanosis


Neuro: Awake,alert, 


sadrum: ulcers, eczematous rash on buttocks





- Constitutional


Vitals: 


                                        











Temp Pulse Resp BP Pulse Ox


 


 98.5 F   87   18   123/61   99 


 


 02/11/20 07:21  02/11/20 07:21  02/11/20 10:00  02/11/20 07:21  02/11/20 10:00











General appearance: Present: no acute distress





Results





- Labs


CBC & Chem 7: 


                                 02/11/20 09:28





                                 02/11/20 09:28


Labs: 


                             Laboratory Last Values











WBC  19.3 K/mm3 (4.5-11.0)  H  02/11/20  09:28    


 


RBC  3.57 M/mm3 (3.65-5.03)  L  02/11/20  09:28    


 


Hgb  10.2 gm/dl (11.8-15.2)  L  02/11/20  09:28    


 


Hct  31.5 % (35.5-45.6)  L  02/11/20  09:28    


 


MCV  88 fl (84-94)   02/11/20  09:28    


 


MCH  29 pg (28-32)   02/11/20  09:28    


 


MCHC  32 % (32-34)   02/11/20  09:28    


 


RDW  14.0 % (13.2-15.2)   02/11/20  09:28    


 


Plt Count  522 K/mm3 (140-440)  H  02/11/20  09:28    


 


Lymph % (Auto)  6.8 % (13.4-35.0)  L  02/07/20  22:56    


 


Mono % (Auto)  10.9 % (0.0-7.3)  H  02/07/20  22:56    


 


Eos % (Auto)  9.2 % (0.0-4.3)  H  02/07/20  22:56    


 


Baso % (Auto)  1.1 % (0.0-1.8)   02/07/20  22:56    


 


Lymph #  1.1 K/mm3 (1.2-5.4)  L  02/07/20  22:56    


 


Mono #  1.8 K/mm3 (0.0-0.8)  H  02/07/20  22:56    


 


Eos #  1.5 K/mm3 (0.0-0.4)  H  02/07/20  22:56    


 


Baso #  0.2 K/mm3 (0.0-0.1)  H  02/07/20  22:56    


 


Add Manual Diff  Complete   02/11/20  09:28    


 


Total Counted  100   02/11/20  09:28    


 


Seg Neutrophils %  72.0 % (40.0-70.0)  H  02/07/20  22:56    


 


Seg Neuts % (Manual)  91.0 % (40.0-70.0)  H  02/11/20  09:28    


 


Band Neutrophils %  0 %  02/11/20  09:28    


 


Lymphocytes % (Manual)  2.0 % (13.4-35.0)  L  02/11/20  09:28    


 


Reactive Lymphs % (Man)  0 %  02/11/20  09:28    


 


Monocytes % (Manual)  3.0 % (0.0-7.3)   02/11/20  09:28    


 


Eosinophils % (Manual)  4.0 % (0.0-4.3)   02/11/20  09:28    


 


Basophils % (Manual)  0 % (0.0-1.8)   02/11/20  09:28    


 


Metamyelocytes %  0 %  02/11/20  09:28    


 


Myelocytes %  0 %  02/11/20  09:28    


 


Promyelocytes %  0 %  02/11/20  09:28    


 


Blast Cells %  0 %  02/11/20  09:28    


 


Nucleated RBC %  Not Reportable   02/11/20  09:28    


 


Seg Neutrophils #  11.6 K/mm3 (1.8-7.7)  H  02/07/20  22:56    


 


Seg Neutrophils # Man  17.6 K/mm3 (1.8-7.7)  H  02/11/20  09:28    


 


Band Neutrophils #  0.0 K/mm3  02/11/20  09:28    


 


Lymphocytes # (Manual)  0.4 K/mm3 (1.2-5.4)  L  02/11/20  09:28    


 


Abs React Lymphs (Man)  0.0 K/mm3  02/11/20  09:28    


 


Monocytes # (Manual)  0.6 K/mm3 (0.0-0.8)   02/11/20  09:28    


 


Eosinophils # (Manual)  0.8 K/mm3 (0.0-0.4)  H  02/11/20  09:28    


 


Basophils # (Manual)  0.0 K/mm3 (0.0-0.1)   02/11/20  09:28    


 


Metamyelocytes #  0.0 K/mm3  02/11/20  09:28    


 


Myelocytes #  0.0 K/mm3  02/11/20  09:28    


 


Promyelocytes #  0.0 K/mm3  02/11/20  09:28    


 


Blast Cells #  0.0 K/mm3  02/11/20  09:28    


 


WBC Morphology  Not Reportable   02/11/20  09:28    


 


Hypersegmented Neuts  Not Reportable   02/11/20  09:28    


 


Hyposegmented Neuts  Not Reportable   02/11/20  09:28    


 


Hypogranular Neuts  Not Reportable   02/11/20  09:28    


 


Smudge Cells  Not Reportable   02/11/20  09:28    


 


Toxic Granulation  Not Reportable   02/11/20  09:28    


 


Toxic Vacuolation  Not Reportable   02/11/20  09:28    


 


Dohle Bodies  Not Reportable   02/11/20  09:28    


 


Pelger-Huet Anomaly  Not Reportable   02/11/20  09:28    


 


Bienvenido Rods  Not Reportable   02/11/20  09:28    


 


Platelet Estimate  Consistent w auto   02/11/20  09:28    


 


Clumped Platelets  Not Reportable   02/11/20  09:28    


 


Plt Clumps, EDTA  Not Reportable   02/11/20  09:28    


 


Large Platelets  Not Reportable   02/11/20  09:28    


 


Giant Platelets  Not Reportable   02/11/20  09:28    


 


Platelet Satelliting  Not Reportable   02/11/20  09:28    


 


Plt Morphology Comment  Not Reportable   02/11/20  09:28    


 


RBC Morphology  Not Reportable   02/11/20  09:28    


 


Dimorphic RBCs  Not Reportable   02/11/20  09:28    


 


Polychromasia  Few   02/11/20  09:28    


 


Hypochromasia  Not Reportable   02/11/20  09:28    


 


Poikilocytosis  Not Reportable   02/11/20  09:28    


 


Anisocytosis  Not Reportable   02/11/20  09:28    


 


Microcytosis  Not Reportable   02/11/20  09:28    


 


Macrocytosis  Not Reportable   02/11/20  09:28    


 


Spherocytes  Not Reportable   02/11/20  09:28    


 


Pappenheimer Bodies  Not Reportable   02/11/20  09:28    


 


Sickle Cells  Not Reportable   02/11/20  09:28    


 


Target Cells  Few   02/11/20  09:28    


 


Tear Drop Cells  Not Reportable   02/11/20  09:28    


 


Ovalocytes  Not Reportable   02/11/20  09:28    


 


Helmet Cells  Not Reportable   02/11/20  09:28    


 


Gross-Aubrey Bodies  Not Reportable   02/11/20  09:28    


 


Cabot Rings  Not Reportable   02/11/20  09:28    


 


Vikki Cells  Not Reportable   02/11/20  09:28    


 


Bite Cells  Not Reportable   02/11/20  09:28    


 


Crenated Cell  Not Reportable   02/11/20  09:28    


 


Elliptocytes  Not Reportable   02/11/20  09:28    


 


Acanthocytes (Spur)  Not Reportable   02/11/20  09:28    


 


Rouleaux  Not Reportable   02/11/20  09:28    


 


Hemoglobin C Crystals  Not Reportable   02/11/20  09:28    


 


Schistocytes  Not Reportable   02/11/20  09:28    


 


Malaria parasites  Not Reportable   02/11/20  09:28    


 


Parker Bodies  Not Reportable   02/11/20  09:28    


 


Hem Pathologist Commnt  No   02/11/20  09:28    


 


Sodium  151 mmol/L (137-145)  H  02/11/20  09:28    


 


Potassium  4.1 mmol/L (3.6-5.0)   02/11/20  09:28    


 


Chloride  119.0 mmol/L ()  H  02/11/20  09:28    


 


Carbon Dioxide  20 mmol/L (22-30)  L  02/11/20  09:28    


 


Anion Gap  16 mmol/L  02/11/20  09:28    


 


BUN  27 mg/dL (9-20)  H  02/11/20  09:28    


 


Creatinine  1.8 mg/dL (0.8-1.5)  H  02/11/20  09:28    


 


Estimated GFR  44 ml/min  02/11/20  09:28    


 


BUN/Creatinine Ratio  15 %  02/11/20  09:28    


 


Glucose  102 mg/dL ()  H  02/11/20  09:28    


 


Lactic Acid  1.20 mmol/L (0.7-2.0)   02/08/20  09:24    


 


Calcium  8.4 mg/dL (8.4-10.2)   02/11/20  09:28    


 


Total Bilirubin  0.20 mg/dL (0.1-1.2)   02/07/20  22:56    


 


AST  15 units/L (5-40)   02/07/20  22:56    


 


ALT  7 units/L (7-56)   02/07/20  22:56    


 


Alkaline Phosphatase  69 units/L ()   02/07/20  22:56    


 


Troponin T  0.021 ng/mL (0.00-0.029)   02/07/20  22:56    


 


Total Protein  6.9 g/dL (6.3-8.2)   02/07/20  22:56    


 


Albumin  2.4 g/dL (3.9-5)  L  02/07/20  22:56    


 


Albumin/Globulin Ratio  0.5 %  02/07/20  22:56    


 


Urine Color  Yellow  (Yellow)   02/07/20  Unknown


 


Urine Turbidity  Slightly-cloudy  (Clear)   02/07/20  Unknown


 


Urine pH  5.0  (5.0-7.0)   02/07/20  Unknown


 


Ur Specific Gravity  1.013  (1.003-1.030)   02/07/20  Unknown


 


Urine Protein  <15 mg/dl mg/dL (Negative)   02/07/20  Unknown


 


Urine Glucose (UA)  Neg mg/dL (Negative)   02/07/20  Unknown


 


Urine Ketones  Neg mg/dL (Negative)   02/07/20  Unknown


 


Urine Blood  Sm  (Negative)   02/07/20  Unknown


 


Urine Nitrite  Neg  (Negative)   02/07/20  Unknown


 


Urine Bilirubin  Neg  (Negative)   02/07/20  Unknown


 


Urine Urobilinogen  < 2.0 mg/dL (<2.0)   02/07/20  Unknown


 


Ur Leukocyte Esterase  Sm  (Negative)   02/07/20  Unknown


 


Urine WBC (Auto)  19.0 /HPF (0.0-6.0)  H  02/07/20  Unknown


 


Urine RBC (Auto)  4.0 /HPF (0.0-6.0)   02/07/20  Unknown


 


U Epithel Cells (Auto)  < 1.0 /HPF (0-13.0)   02/07/20  Unknown


 


Urine Bacteria (Auto)  1+ /HPF (Negative)   02/07/20  Unknown


 


Hyaline Casts  1 /LPF  02/07/20  Unknown


 


Urine Mucus  Few /HPF  02/07/20  Unknown


 


Blood Type  O POSITIVE   02/08/20  02:30    


 


Antibody Screen  Negative   02/08/20  02:30    














Active Medications





- Current Medications


Current Medications: 














Generic Name Dose Route Start Last Admin





  Trade Name Freq  PRN Reason Stop Dose Admin


 


Acetaminophen  650 mg  02/08/20 00:43  02/09/20 17:13





  Tylenol  PO   650 mg





  Q4H PRN   Administration





  Pain MILD(1-3)/Fever >100.5/HA  


 


Dextrose  25 gm  02/11/20 11:54 





  D50w (25gm) Vial  IV  02/11/20 11:55 





  ONCE STA  


 


Diphenhydramine HCl  25 mg  02/08/20 18:44  02/10/20 18:55





  Benadryl  PO   25 mg





  Q6H PRN   Administration





  Itching  


 


Heparin Sodium (Porcine)  5,000 unit  02/08/20 10:00  02/11/20 09:50





  Heparin  SUB-Q   5,000 unit





  Q12HR DACIA   Administration


 


Cefepime HCl  2 gm in 100 mls @ 200 mls/hr  02/08/20 10:00  02/11/20 09:48





  Cefepime/Ns 2 Gm/100 Ml  IV   200 mls/hr





  Q24HR DACIA   Administration





  Protocol  


 


Dextrose  1,000 mls @ 75 mls/hr  02/10/20 09:00  02/10/20 10:18





  D5w  IV   75 mls/hr





  AS DIRECT DACIA   Administration


 


Ondansetron HCl  4 mg  02/08/20 00:43  02/10/20 12:21





  Zofran  IV   4 mg





  Q8H PRN   Administration





  Nausea And Vomiting  


 


Sodium Chloride  10 ml  02/08/20 10:00  02/11/20 09:51





  Sodium Chloride Flush Syringe 10 Ml  IV   10 ml





  BID DACIA   Administration


 


Sodium Chloride  10 ml  02/08/20 00:43 





  Sodium Chloride Flush Syringe 10 Ml  IV  





  PRN PRN  





  LINE FLUSH  


 


Triamcinolone Acetonide  1 applic  02/10/20 22:00  02/11/20 09:51





  Kenalog  TP   1 applic





  BID DACIA   Administration














Nutrition/Malnutrition Assess





- Dietary Evaluation


Nutrition/Malnutrition Findings: 


                                        





Nutrition Notes                                            Start:  02/08/20 

11:12


Freq:                                                      Status: Active       




Protocol:                                                                       




 Document     02/08/20 11:12  CT  (Rec: 02/08/20 11:19  CT  [id number])


 Co-Sign      02/08/20 11:12  LP


 Nutrition Notes


     Need for Assessment generated from:         RN Referral,MST


     Initial or Follow up                        Assessment


     Current Diagnosis                           CKD(stage I-IV),Sepsis,


                                                 Hypertension,Stroke


     Other Pertinent Diagnosis                   AMS, dementia, Acute Metabolic


                                                 Encephalopathy, wounds


     Current Diet                                Cardiac Diet


     Labs/Tests                                  Na 147


                                                 Cr 2.6


                                                 Glu 110


     Pertinent Medications                       NS 75 ml/hr


     Height                                      5 ft 6 in


     Weight                                      78 kg


     Usual Body Weight                           65.771 kg


     Ideal Body Weight (kg)                      64.54


     BMI                                         27.7


     Intake Prior to Admission                   Good


     Weight Status                               Overweight


     Subjective/Other Information                RN screen for MST. Pt stated


                                                 his UBW is 145 lbs and has not


                                                 noticed any wt loss. Pt


                                                 stated he was eating well PTA


                                                 and consumed 75% of breakfast


                                                 tray.  Pt stated he has a good


                                                 appetite and has not had any


                                                 N/V. Noted slight orbital


                                                 wasting.


     Burn                                        Absent


     Trauma                                      Absent


     GI Symptoms                                 None


     Food Allergy                                No


     Current % PO                                Good (%)


     Minimum of two criteria                     No


     Body Fat Depletion                          Mild depletion (non-severe)


     #1


      Nutrition Diagnosis                        Increased nutrient needs   (


                                                 specify in comment below),No


                                                 nutrition diagnosis at this


                                                 time


      Comments:                                  Protein


      Etiology                                   wound healing


      As Evidenced by Signs and Symptoms         sacral and back wounds


     Is patient on ventilator?                   No


     Is Patient Ambulatory and/or Out of Bed     No


     REE-([facility]-confined to bed)      [phone]


     Calculation Used for Recommendations        [facility]


     Additional Notes                            Protein needs:  g/day (0


                                                 .8-1.5 g/kg/day) CKD and wound


                                                 healing


                                                 Fluid needs: 1 ml/kcal or per


                                                 MD


 Nutrition Intervention


     Change Diet Order:                          Change to Renal diet


     Add Supplement/Snack (indicate name/kcal    Add Landon BID


      /protein )                                 


     Provides kCal:                              190


     Provides Protein (gm)                       5


     Goal #1                                     Meet >75% of energy and


                                                 protein needs


     Anticipated Discharge Needs:                Renal Diet


     Follow-Up By:                               02/11/20


     Additional Comments                         Follow up for PO intakes and


                                                 ONS need

## 2020-02-11 NOTE — VASCULAR LAB REPORT
VL venous duplex UE BILAT



INDICATION / CLINICAL INFORMATION:

swelling both upper ext.



COMPARISON:

None available.



FINDINGS:

No evidence of deep vein thrombosis in either arm.



IMPRESSION:

1. Negative study. No evidence of DVT. 



Signer Name: Viet Lopez MD 

Signed: 2/11/2020 2:26 PM

 Workstation Name: [id number]

## 2020-02-11 NOTE — XRAY REPORT
CHEST 1 VIEW 



INDICATION: 

Cough.



COMPARISON: 

2/7/2020



FINDINGS:

Support devices: None.



Heart: Within normal limits. 

Lungs/Pleura: No acute air space or interstitial disease. 



Additional findings: None.



IMPRESSION:

1. No acute findings.



Signer Name: Eduardo Ibanez MD 

Signed: 2/11/2020 9:01 AM

 Workstation Name: [id number]

## 2020-02-12 LAB
BUN SERPL-MCNC: 24 MG/DL (ref 9–20)
BUN/CREAT SERPL: 12 %
CALCIUM SERPL-MCNC: 7.8 MG/DL (ref 8.4–10.2)
HCT VFR BLD CALC: 29 % (ref 35.5–45.6)
HEMOLYSIS INDEX: 2
HGB BLD-MCNC: 9.5 GM/DL (ref 11.8–15.2)
MCHC RBC AUTO-ENTMCNC: 33 % (ref 32–34)
MCV RBC AUTO: 88 FL (ref 84–94)
PLATELET # BLD: 460 K/MM3 (ref 140–440)
RBC # BLD AUTO: 3.29 M/MM3 (ref 3.65–5.03)

## 2020-02-12 RX ADMIN — TRIAMCINOLONE ACETONIDE SCH APPLIC: 1 CREAM TOPICAL at 12:34

## 2020-02-12 RX ADMIN — ACETAMINOPHEN PRN MG: 325 TABLET ORAL at 20:48

## 2020-02-12 RX ADMIN — DEXTROSE SCH MLS/HR: 5 SOLUTION INTRAVENOUS at 20:55

## 2020-02-12 RX ADMIN — Medication SCH ML: at 11:31

## 2020-02-12 RX ADMIN — HEPARIN SODIUM SCH: 5000 INJECTION, SOLUTION INTRAVENOUS; SUBCUTANEOUS at 15:44

## 2020-02-12 RX ADMIN — Medication SCH ML: at 21:27

## 2020-02-12 RX ADMIN — CEFEPIME SCH: 2 INJECTION, POWDER, FOR SOLUTION INTRAVENOUS at 15:45

## 2020-02-12 RX ADMIN — HEPARIN SODIUM SCH UNIT: 5000 INJECTION, SOLUTION INTRAVENOUS; SUBCUTANEOUS at 08:20

## 2020-02-12 RX ADMIN — TRIAMCINOLONE ACETONIDE SCH APPLIC: 1 CREAM TOPICAL at 21:27

## 2020-02-12 RX ADMIN — CEFEPIME SCH MLS/HR: 2 INJECTION, POWDER, FOR SOLUTION INTRAVENOUS at 08:22

## 2020-02-12 RX ADMIN — AMPICILLIN AND SULBACTAM SCH MLS/HR: 1; 2 INJECTION, POWDER, FOR SOLUTION INTRAMUSCULAR; INTRAVENOUS at 21:25

## 2020-02-12 RX ADMIN — HEPARIN SODIUM SCH UNIT: 5000 INJECTION, SOLUTION INTRAVENOUS; SUBCUTANEOUS at 21:25

## 2020-02-12 NOTE — CONSULTATION
History of Present Illness





- Reason for Consult


Consult date: 02/12/20





- History of Present Illness





85 yo M PMHx dementia, CVA, CKD admitted to the hospital after being found to 

have an altered mental status. He lives at home with his wife, who noticed that 

he was altered from his baseline mental status. He was also noted to have cough 

and fevers for 3 days prior to admission. He was not in any distress on 

admission and was admitted to the floors. 





He was found to be febrile to 102.85 on admission but has been mostly afebrile 

since then. His white count is 17. He is currently receiving cefepime. Blood and

urine cultures are NGTD. No other concerns at this time. 





Imaging personally reviewed:


CXR: no acute infectious abnormality. 





Review of Systems: Bold if positive, otherwise negative


General: fevers, chills, rigors


HEENT: visual disturbance, diplopia, eye pain


Respiratory: cough, sputum, hemoptysis, shortness of breath


Cardiovascular: chest pain, syncope


Gastrointestinal: nausea, vomiting, diarrhea, abdominal pain


Genitourinary: dysuria, hematuria, flank pain


Musculoskeletal: neck pain, back pain, joint pain, edema 


Neurologic: headaches, seizures


Hematologic: easy bruising or bleeding


Endocrine: night sweats, acute weight loss


Skin: rash, jaundice, redness


Psychiatric: suicidal, homicidal ideation





Past History


Past Medical History: other (As noted in HPI)


Past Surgical History: Other (Unable to obtain)


Social history: other (Unable to obtain)


Family history: other (Unable to obtain)





Medications and Allergies


                                    Allergies











Allergy/AdvReac Type Severity Reaction Status Date / Time


 


No Known Allergies Allergy   Verified 06/06/19 18:56











                                Home Medications











 Medication  Instructions  Recorded  Confirmed  Last Taken  Type


 


levETIRAcetam [Keppra TAB] 250 mg PO BID #60 tablet 06/06/19 02/08/20 Unknown Rx


 


Aspirin 325 mg PO ONCE 02/08/20 02/08/20 Unknown History


 


Doxepin [SINEquan] 10 mg PO QHS 02/08/20 02/08/20 Unknown History


 


Loratadine 10 mg PO DAILY 02/08/20 02/08/20 Unknown History


 


Sennosides/Docusate [Senokot S] 2 each PO QHS 02/08/20 02/08/20 Unknown History


 


amLODIPine [Norvasc] 5 mg PO DAILY 02/08/20 02/08/20 Unknown History


 


hydrOXYzine HCL [Atarax] 25 mg PO TID 02/08/20 02/08/20 Unknown History


 


lisinopriL [Zestril] 20 mg PO QDAY 02/08/20 02/08/20 Unknown History











Active Meds: 


Active Medications





Acetaminophen (Tylenol)  650 mg PO Q4H PRN


   PRN Reason: Pain MILD(1-3)/Fever >100.5/HA


   Last Admin: 02/11/20 22:32 Dose:  650 mg


   Documented by: 


Diphenhydramine HCl (Benadryl)  25 mg PO Q6H PRN


   PRN Reason: Itching


   Last Admin: 02/12/20 11:32 Dose:  25 mg


   Documented by: 


Heparin Sodium (Porcine) (Heparin)  5,000 unit SUB-Q Q12HR [facility]


   Last Admin: 02/12/20 08:20 Dose:  5,000 unit


   Documented by: 


Cefepime HCl (Cefepime/Ns 2 Gm/100 Ml)  2 gm in 100 mls @ 200 mls/hr IV Q24HR 

DACIA; Protocol


   Last Admin: 02/12/20 08:22 Dose:  200 mls/hr


   Documented by: 


Dextrose (D5w)  1,000 mls @ 75 mls/hr IV AS DIRECT DACIA


   Last Admin: 02/10/20 10:18 Dose:  75 mls/hr


   Documented by: 


Ondansetron HCl (Zofran)  4 mg IV Q8H PRN


   PRN Reason: Nausea And Vomiting


   Last Admin: 02/10/20 12:21 Dose:  4 mg


   Documented by: 


Sodium Chloride (Sodium Chloride Flush Syringe 10 Ml)  10 ml IV BID [facility]


   Last Admin: 02/12/20 11:31 Dose:  10 ml


   Documented by: 


Sodium Chloride (Sodium Chloride Flush Syringe 10 Ml)  10 ml IV PRN PRN


   PRN Reason: LINE FLUSH


Triamcinolone Acetonide (Kenalog)  1 applic TP BID [facility]


   Last Admin: 02/12/20 12:34 Dose:  1 applic


   Documented by: 











Physical Examination





- Physical Exam


Narrative exam: 





Physical Exam: 


Constitutional: Alert, pleasantly confused. No acute distress


Head, Ears, Nose: Normocephalic, atraumatic. External ears, nose normal


Eyes: Conjunctivae/corneas clear. No icterus. No ptosis.


Neck: Supple, no meningeal signs


Oral: dentition fair, no thrush


Cardiovascular: S1, S2 normal. 


Respiratory: Good air entry, clear to auscultation bilaterally


GI: Soft, non-tender; bowel sounds normal. No peritoneal signs. 


Musculoskeletal: No pedal edema, no cyanosis.


Skin: Generalized rash over back+buttocks. 


Hem/Lymphatic: No palpable cervical or supraclavicular nodes. No lymphangitis


Psych: Mood ok. Affect normal


Neurological: Awake, alert, disoriented. No gross abnormality





- Constitutional


Vitals: 


                                   Vital Signs











Temp Pulse Resp BP Pulse Ox


 


 97.4 F L  77   20   126/66   97 


 


 02/12/20 12:33  02/12/20 12:33  02/12/20 12:33  02/12/20 12:33  02/12/20 12:33








                           Temperature -Last 24 Hours











Temperature                    97.4 F


 


Temperature                    98.3 F


 


Temperature                    98.3 F


 


Temperature                    98.0 F

















Results





- Labs


CBC & Chem 7: 


                                 02/12/20 03:40





                                 02/12/20 03:40


Labs: 


                              Abnormal lab results











  02/12/20 02/12/20 Range/Units





  03:40 03:40 


 


WBC  17.6 H   (4.5-11.0)  K/mm3


 


RBC  3.29 L   (3.65-5.03)  M/mm3


 


Hgb  9.5 L   (11.8-15.2)  gm/dl


 


Hct  29.0 L   (35.5-45.6)  %


 


Plt Count  460 H   (140-440)  K/mm3


 


Sodium   149 H  (137-145)  mmol/L


 


Chloride   117.8 H  ()  mmol/L


 


Carbon Dioxide   21 L  (22-30)  mmol/L


 


BUN   24 H  (9-20)  mg/dL


 


Creatinine   2.0 H  (0.8-1.5)  mg/dL


 


Glucose   107 H  ()  mg/dL


 


Calcium   7.8 L  (8.4-10.2)  mg/dL














Assessment and Plan





Cultures:


2/7/2020 urine cultures: no growth


2/7/2020 blood cultures: pending


2/9/2020 urine cultures: pending


MRSA swab: negative





A&P: 85 yo M PMhx dementia, CKD, CVA admitted with altered mental status. 





#SIRS: presents on admission with fever and leukocytosis. Unclear etiology at 

this point, had mild pyuria on UA however cultures have been negative. Blood c

ultures negative as well. CXR without focal abnormality. Recommend ruling out 

flu given fevers, cough. Has significant eczema over his back and buttocks, with

 probable superinfection. 


#Cellulitis: White count not responding to cefepime. Patient unlikely to have 

Pseudomonas at this point, recommend switching to Unasyn to add anaerobic 

coverage. 


#Pyuria: neglible white cells in urine, cultures negative


#CKD: renally dose antibiotics


#Dementia





Recommendations:


- stop cefepime


- start Unasyn 3g q8h renally dosed


- ordered flu swab





Thank you for the consult, will continue to follow





LIZZY Garcia MD


[facility] ([facility]


M: [phone]


O: [phone]


F: [phone]

## 2020-02-13 LAB
BUN SERPL-MCNC: 23 MG/DL (ref 9–20)
BUN/CREAT SERPL: 13 %
CALCIUM SERPL-MCNC: 7.9 MG/DL (ref 8.4–10.2)
HCT VFR BLD CALC: 29.4 % (ref 35.5–45.6)
HEMOLYSIS INDEX: 13
HGB BLD-MCNC: 9.6 GM/DL (ref 11.8–15.2)
MCHC RBC AUTO-ENTMCNC: 33 % (ref 32–34)
MCV RBC AUTO: 87 FL (ref 84–94)
PLATELET # BLD: 466 K/MM3 (ref 140–440)
RBC # BLD AUTO: 3.39 M/MM3 (ref 3.65–5.03)

## 2020-02-13 RX ADMIN — TRIAMCINOLONE ACETONIDE SCH APPLIC: 1 CREAM TOPICAL at 09:53

## 2020-02-13 RX ADMIN — Medication SCH ML: at 09:54

## 2020-02-13 RX ADMIN — HEPARIN SODIUM SCH UNIT: 5000 INJECTION, SOLUTION INTRAVENOUS; SUBCUTANEOUS at 09:54

## 2020-02-13 RX ADMIN — DEXTROSE SCH MLS/HR: 5 SOLUTION INTRAVENOUS at 06:49

## 2020-02-13 RX ADMIN — Medication SCH ML: at 22:43

## 2020-02-13 RX ADMIN — TRIAMCINOLONE ACETONIDE SCH APPLIC: 1 CREAM TOPICAL at 22:42

## 2020-02-13 RX ADMIN — METRONIDAZOLE SCH MLS/HR: 5 INJECTION, SOLUTION INTRAVENOUS at 16:01

## 2020-02-13 RX ADMIN — AMPICILLIN AND SULBACTAM SCH MLS/HR: 1; 2 INJECTION, POWDER, FOR SOLUTION INTRAMUSCULAR; INTRAVENOUS at 09:53

## 2020-02-13 RX ADMIN — HEPARIN SODIUM SCH UNIT: 5000 INJECTION, SOLUTION INTRAVENOUS; SUBCUTANEOUS at 22:42

## 2020-02-13 RX ADMIN — METRONIDAZOLE SCH MLS/HR: 5 INJECTION, SOLUTION INTRAVENOUS at 22:38

## 2020-02-13 NOTE — PROGRESS NOTE
Assessment and Plan





Cultures:


2/7/2020 urine cultures: no growth


2/7/2020 blood cultures: pending


2/9/2020 urine cultures: pending


MRSA swab: negative





A&P: 85 yo M PMhx dementia, CKD, CVA admitted with altered mental status. 





#SIRS: presents on admission with fever and leukocytosis. Unclear etiology at 

this point, had mild pyuria on UA however cultures have been negative. Blood 

cultures negative as well. CXR without focal abnormality. Recommend ruling out 

flu given fevers, cough. Has significant eczema over his back and buttocks, with

probable superinfection. 


#Cellulitis: Change to cefazolin


#Pyuria: neglible white cells in urine, cultures negative


#CKD: renally dose antibiotics


#Dementia





Recommendations:


- stop Unasyn


- start cefazolin/Flagyl. 


- follow up flu swab





Thank you for the consult, will continue to follow





LIZZY Garcia MD


[facility] ([facility])


M: [phone]


O: [phone]


F: [phone] 





Subjective


Date of service: 02/13/20


Interval history: 





Afebrile, persistently elevated white count. No new complaints. 





Objective





- Exam


Narrative Exam: 





Physical Exam: 


Constitutional: Alert, pleasantly confused. No acute distress


Head, Ears, Nose: Normocephalic, atraumatic. External ears, nose normal


Eyes: Conjunctivae/corneas clear. No icterus. No ptosis.


Oral: dentition fair, no thrush


Cardiovascular: S1, S2 normal. 


Respiratory: Good air entry, clear to auscultation bilaterally


GI: Soft, non-tender; bowel sounds normal. No peritoneal signs. 


Musculoskeletal: No pedal edema, no cyanosis.


Skin: Generalized redness over back+buttocks with diffuse dry skin. 


Hem/Lymphatic: No palpable cervical or supraclavicular nodes. No lymphangitis


Psych: Mood ok. Affect normal


Neurological: Awake, alert, disoriented. No gross abnormality





- Constitutional


Vitals: 


                                   Vital Signs











Temp Pulse Resp BP Pulse Ox


 


 98.2 F   67   20   141/73   96 


 


 02/13/20 07:51  02/13/20 07:51  02/13/20 10:00  02/13/20 07:51  02/13/20 08:33








                           Temperature -Last 24 Hours











Temperature                    98.2 F


 


Temperature                    99.0 F


 


Temperature                    98.7 F

















- Labs


CBC & Chem 7: 


                                 02/13/20 05:16





                                 02/13/20 03:34


Labs: 


                              Abnormal lab results











  02/13/20 02/13/20 Range/Units





  03:34 05:16 


 


WBC   20.1 H  (4.5-11.0)  K/mm3


 


RBC   3.39 L  (3.65-5.03)  M/mm3


 


Hgb   9.6 L  (11.8-15.2)  gm/dl


 


Hct   29.4 L  (35.5-45.6)  %


 


Plt Count   466 H  (140-440)  K/mm3


 


Chloride  113.3 H   ()  mmol/L


 


Carbon Dioxide  18 L   (22-30)  mmol/L


 


BUN  23 H   (9-20)  mg/dL


 


Creatinine  1.8 H   (0.8-1.5)  mg/dL


 


Glucose  110 H   ()  mg/dL


 


Calcium  7.9 L   (8.4-10.2)  mg/dL

## 2020-02-13 NOTE — PROGRESS NOTE
Assessment and Plan


Assessment and plan: 


Toxic metabolic encephalopathy


-Multifactorial, sepsis, UTI, cellulitis


-neuro checks


-Continue supportive care





Sepsis


-Persistent leukocytosis


-Consulted ID, discussed with Dr. Garcia


-Now started on Cefaxzolin and Flagyl. 


 Unasyn discontinued


-Continue supportive care





CKD


-Patient at baseline.  Creatinine June 2019 1.9.


-Avoid nephrotoxic agents


-Renal dose all meds


-strict I/O,


-monitor uop q shift,





Hypernatremia


-Start D5W IV fluids





Acute cystitis


-IV antibiotics continued


-Culture pending


-IV fluids





Vomiting


CXR done neg





Bilateral upper ext swelling


Get doppler US





Dementia


-Reorient as needed


-Supportive care


-Continue home meds once reconciled





Cellulitis buttocks


-Wound care consulted


-On IV ABX





 DVT prophylaxis


-SCDs bilateral extremities


-Heparin subcutanous





2/13 WBC still elevated. Dr. Garcia started Cefazolin and Flagyl. Stopped 

Unasyn.











History


Interval history: 


Vomiting resolved


still has redness buttocks








Hospitalist Physical





- Physical exam


Narrative exam: 


GEN: Not in acute distress, lying in bed, 


HEENT: Normocephalic, atraumatic, 


Neck: supple, No JVD


Lungs: Clear to auscultation bilat, no wheeze,


heart;S1 and S2 reg, no murmurs, rubs or gallop


Abd:soft, non tender, normal bowel sounds


Ext: Swelling both upper ext, No edema, no clubbing, no cyanosis


Neuro: Awake,alert, 


sadrum: eruythema buttocks, eczematous rash on buttocks,back





- Constitutional


Vitals: 


                                        











Temp Pulse Resp BP Pulse Ox


 


 97.4 F L  103 H  18   134/78   98 


 


 02/13/20 13:20  02/13/20 13:20  02/13/20 13:20  02/13/20 13:20  02/13/20 13:20











General appearance: Present: no acute distress





Results





- Labs


CBC & Chem 7: 


                                 02/13/20 05:16





                                 02/13/20 03:34


Labs: 


                             Laboratory Last Values











WBC  20.1 K/mm3 (4.5-11.0)  H  02/13/20  05:16    


 


RBC  3.39 M/mm3 (3.65-5.03)  L  02/13/20  05:16    


 


Hgb  9.6 gm/dl (11.8-15.2)  L  02/13/20  05:16    


 


Hct  29.4 % (35.5-45.6)  L  02/13/20  05:16    


 


MCV  87 fl (84-94)   02/13/20  05:16    


 


MCH  28 pg (28-32)   02/13/20  05:16    


 


MCHC  33 % (32-34)   02/13/20  05:16    


 


RDW  13.9 % (13.2-15.2)   02/13/20  05:16    


 


Plt Count  466 K/mm3 (140-440)  H  02/13/20  05:16    


 


Lymph % (Auto)  6.8 % (13.4-35.0)  L  02/07/20  22:56    


 


Mono % (Auto)  10.9 % (0.0-7.3)  H  02/07/20  22:56    


 


Eos % (Auto)  9.2 % (0.0-4.3)  H  02/07/20  22:56    


 


Baso % (Auto)  1.1 % (0.0-1.8)   02/07/20  22:56    


 


Lymph #  1.1 K/mm3 (1.2-5.4)  L  02/07/20  22:56    


 


Mono #  1.8 K/mm3 (0.0-0.8)  H  02/07/20  22:56    


 


Eos #  1.5 K/mm3 (0.0-0.4)  H  02/07/20  22:56    


 


Baso #  0.2 K/mm3 (0.0-0.1)  H  02/07/20  22:56    


 


Add Manual Diff  Complete   02/11/20  09:28    


 


Total Counted  100   02/11/20  09:28    


 


Seg Neutrophils %  72.0 % (40.0-70.0)  H  02/07/20  22:56    


 


Seg Neuts % (Manual)  91.0 % (40.0-70.0)  H  02/11/20  09:28    


 


Band Neutrophils %  0 %  02/11/20  09:28    


 


Lymphocytes % (Manual)  2.0 % (13.4-35.0)  L  02/11/20  09:28    


 


Reactive Lymphs % (Man)  0 %  02/11/20  09:28    


 


Monocytes % (Manual)  3.0 % (0.0-7.3)   02/11/20  09:28    


 


Eosinophils % (Manual)  4.0 % (0.0-4.3)   02/11/20  09:28    


 


Basophils % (Manual)  0 % (0.0-1.8)   02/11/20  09:28    


 


Metamyelocytes %  0 %  02/11/20  09:28    


 


Myelocytes %  0 %  02/11/20  09:28    


 


Promyelocytes %  0 %  02/11/20  09:28    


 


Blast Cells %  0 %  02/11/20  09:28    


 


Nucleated RBC %  Not Reportable   02/11/20  09:28    


 


Seg Neutrophils #  11.6 K/mm3 (1.8-7.7)  H  02/07/20  22:56    


 


Seg Neutrophils # Man  17.6 K/mm3 (1.8-7.7)  H  02/11/20  09:28    


 


Band Neutrophils #  0.0 K/mm3  02/11/20  09:28    


 


Lymphocytes # (Manual)  0.4 K/mm3 (1.2-5.4)  L  02/11/20  09:28    


 


Abs React Lymphs (Man)  0.0 K/mm3  02/11/20  09:28    


 


Monocytes # (Manual)  0.6 K/mm3 (0.0-0.8)   02/11/20  09:28    


 


Eosinophils # (Manual)  0.8 K/mm3 (0.0-0.4)  H  02/11/20  09:28    


 


Basophils # (Manual)  0.0 K/mm3 (0.0-0.1)   02/11/20  09:28    


 


Metamyelocytes #  0.0 K/mm3  02/11/20  09:28    


 


Myelocytes #  0.0 K/mm3  02/11/20  09:28    


 


Promyelocytes #  0.0 K/mm3  02/11/20  09:28    


 


Blast Cells #  0.0 K/mm3  02/11/20  09:28    


 


WBC Morphology  Not Reportable   02/11/20  09:28    


 


Hypersegmented Neuts  Not Reportable   02/11/20  09:28    


 


Hyposegmented Neuts  Not Reportable   02/11/20  09:28    


 


Hypogranular Neuts  Not Reportable   02/11/20  09:28    


 


Smudge Cells  Not Reportable   02/11/20  09:28    


 


Toxic Granulation  Not Reportable   02/11/20  09:28    


 


Toxic Vacuolation  Not Reportable   02/11/20  09:28    


 


Dohle Bodies  Not Reportable   02/11/20  09:28    


 


Pelger-Huet Anomaly  Not Reportable   02/11/20  09:28    


 


Bienvenido Rods  Not Reportable   02/11/20  09:28    


 


Platelet Estimate  Consistent w auto   02/11/20  09:28    


 


Clumped Platelets  Not Reportable   02/11/20  09:28    


 


Plt Clumps, EDTA  Not Reportable   02/11/20  09:28    


 


Large Platelets  Not Reportable   02/11/20  09:28    


 


Giant Platelets  Not Reportable   02/11/20  09:28    


 


Platelet Satelliting  Not Reportable   02/11/20  09:28    


 


Plt Morphology Comment  Not Reportable   02/11/20  09:28    


 


RBC Morphology  Not Reportable   02/11/20  09:28    


 


Dimorphic RBCs  Not Reportable   02/11/20  09:28    


 


Polychromasia  Few   02/11/20  09:28    


 


Hypochromasia  Not Reportable   02/11/20  09:28    


 


Poikilocytosis  Not Reportable   02/11/20  09:28    


 


Anisocytosis  Not Reportable   02/11/20  09:28    


 


Microcytosis  Not Reportable   02/11/20  09:28    


 


Macrocytosis  Not Reportable   02/11/20  09:28    


 


Spherocytes  Not Reportable   02/11/20  09:28    


 


Pappenheimer Bodies  Not Reportable   02/11/20  09:28    


 


Sickle Cells  Not Reportable   02/11/20  09:28    


 


Target Cells  Few   02/11/20  09:28    


 


Tear Drop Cells  Not Reportable   02/11/20  09:28    


 


Ovalocytes  Not Reportable   02/11/20  09:28    


 


Helmet Cells  Not Reportable   02/11/20  09:28    


 


Gross-Kenton Bodies  Not Reportable   02/11/20  09:28    


 


Cabot Rings  Not Reportable   02/11/20  09:28    


 


[place] Cells  Not Reportable   02/11/20  09:28    


 


Bite Cells  Not Reportable   02/11/20  09:28    


 


Crenated Cell  Not Reportable   02/11/20  09:28    


 


Elliptocytes  Not Reportable   02/11/20  09:28    


 


Acanthocytes (Spur)  Not Reportable   02/11/20  09:28    


 


Rouleaux  Not Reportable   02/11/20  09:28    


 


Hemoglobin C Crystals  Not Reportable   02/11/20  09:28    


 


Schistocytes  Not Reportable   02/11/20  09:28    


 


Malaria parasites  Not Reportable   02/11/20  09:28    


 


Parker Bodies  Not Reportable   02/11/20  09:28    


 


Hem Pathologist Commnt  No   02/11/20  09:28    


 


Sodium  145 mmol/L (137-145)   02/13/20  03:34    


 


Potassium  4.5 mmol/L (3.6-5.0)   02/13/20  03:34    


 


Chloride  113.3 mmol/L ()  H  02/13/20  03:34    


 


Carbon Dioxide  18 mmol/L (22-30)  L  02/13/20  03:34    


 


Anion Gap  18 mmol/L  02/13/20  03:34    


 


BUN  23 mg/dL (9-20)  H  02/13/20  03:34    


 


Creatinine  1.8 mg/dL (0.8-1.5)  H  02/13/20  03:34    


 


Estimated GFR  44 ml/min  02/13/20  03:34    


 


BUN/Creatinine Ratio  13 %  02/13/20  03:34    


 


Glucose  110 mg/dL ()  H  02/13/20  03:34    


 


POC Glucose  78  ()   02/11/20  12:05    


 


Lactic Acid  1.20 mmol/L (0.7-2.0)   02/08/20  09:24    


 


Calcium  7.9 mg/dL (8.4-10.2)  L  02/13/20  03:34    


 


Total Bilirubin  0.20 mg/dL (0.1-1.2)   02/07/20  22:56    


 


AST  15 units/L (5-40)   02/07/20  22:56    


 


ALT  7 units/L (7-56)   02/07/20  22:56    


 


Alkaline Phosphatase  69 units/L ()   02/07/20  22:56    


 


Troponin T  0.021 ng/mL (0.00-0.029)   02/07/20  22:56    


 


Total Protein  6.9 g/dL (6.3-8.2)   02/07/20  22:56    


 


Albumin  2.4 g/dL (3.9-5)  L  02/07/20  22:56    


 


Albumin/Globulin Ratio  0.5 %  02/07/20  22:56    


 


Urine Color  Yellow  (Yellow)   02/07/20  Unknown


 


Urine Turbidity  Slightly-cloudy  (Clear)   02/07/20  Unknown


 


Urine pH  5.0  (5.0-7.0)   02/07/20  Unknown


 


Ur Specific Gravity  1.013  (1.003-1.030)   02/07/20  Unknown


 


Urine Protein  <15 mg/dl mg/dL (Negative)   02/07/20  Unknown


 


Urine Glucose (UA)  Neg mg/dL (Negative)   02/07/20  Unknown


 


Urine Ketones  Neg mg/dL (Negative)   02/07/20  Unknown


 


Urine Blood  Sm  (Negative)   02/07/20  Unknown


 


Urine Nitrite  Neg  (Negative)   02/07/20  Unknown


 


Urine Bilirubin  Neg  (Negative)   02/07/20  Unknown


 


Urine Urobilinogen  < 2.0 mg/dL (<2.0)   02/07/20  Unknown


 


Ur Leukocyte Esterase  Sm  (Negative)   02/07/20  Unknown


 


Urine WBC (Auto)  19.0 /HPF (0.0-6.0)  H  02/07/20  Unknown


 


Urine RBC (Auto)  4.0 /HPF (0.0-6.0)   02/07/20  Unknown


 


U Epithel Cells (Auto)  < 1.0 /HPF (0-13.0)   02/07/20  Unknown


 


Urine Bacteria (Auto)  1+ /HPF (Negative)   02/07/20  Unknown


 


Hyaline Casts  1 /LPF  02/07/20  Unknown


 


Urine Mucus  Few /HPF  02/07/20  Unknown


 


Blood Type  O POSITIVE   02/08/20  02:30    


 


Antibody Screen  Negative   02/08/20  02:30    














Active Medications





- Current Medications


Current Medications: 














Generic Name Dose Route Start Last Admin





  Trade Name Freq  PRN Reason Stop Dose Admin


 


Acetaminophen  650 mg  02/08/20 00:43  02/12/20 20:48





  Tylenol  PO   650 mg





  Q4H PRN   Administration





  Pain MILD(1-3)/Fever >100.5/HA  


 


Diphenhydramine HCl  25 mg  02/08/20 18:44  02/13/20 12:48





  Benadryl  PO   25 mg





  Q6H PRN   Administration





  Itching  


 


Heparin Sodium (Porcine)  5,000 unit  02/08/20 10:00  02/13/20 09:54





  Heparin  SUB-Q   5,000 unit





  Q12HR DACIA   Administration


 


Dextrose  1,000 mls @ 75 mls/hr  02/10/20 09:00  02/13/20 06:49





  D5w  IV   75 mls/hr





  AS DIRECT DACIA   Administration


 


Metronidazole  500 mg in 100 mls @ 100 mls/hr  02/13/20 14:00  02/13/20 16:01





  Flagyl 500 Mg/100 Ml  IV   100 mls/hr





  Q8HR DACIA   Administration





  Protocol  


 


Cefazolin Sodium 2 gm/ Sodium  100 mls @ 200 mls/hr  02/13/20 15:00  02/13/20 

16:00





  Chloride  IV   200 mls/hr





  Q8HR DACIA   Administration


 


Ondansetron HCl  4 mg  02/08/20 00:43  02/10/20 12:21





  Zofran  IV   4 mg





  Q8H PRN   Administration





  Nausea And Vomiting  


 


Sodium Chloride  10 ml  02/08/20 10:00  02/13/20 09:54





  Sodium Chloride Flush Syringe 10 Ml  IV   10 ml





  BID DACIA   Administration


 


Sodium Chloride  10 ml  02/08/20 00:43 





  Sodium Chloride Flush Syringe 10 Ml  IV  





  PRN PRN  





  LINE FLUSH  


 


Triamcinolone Acetonide  1 applic  02/10/20 22:00  02/13/20 09:53





  Kenalog  TP   1 applic





  BID DACIA   Administration














Nutrition/Malnutrition Assess





- Dietary Evaluation


Nutrition/Malnutrition Findings: 


                                        





Nutrition Notes                                            Start:  02/08/20 

11:12


Freq:                                                      Status: Active       




Protocol:                                                                       




 Document     02/13/20 12:04  CW  (Rec: 02/13/20 12:17  CW  [id number])


 Co-Sign      02/13/20 12:04  LP


 Nutrition Notes


     Initial or Follow up                        Reassessment


     Current Diagnosis                           CKD(stage I-IV),Sepsis,


                                                 Hypertension,Stroke


     Other Pertinent Diagnosis                   AMS, dementia, Acute Metabolic


                                                 Encephalopathy, wounds


     Current Diet                                Regular Pureed Diet


     Labs/Tests                                  BUN 23


                                                 Cr 1.8


                                                 


     Pertinent Medications                       D5w at 75 ml/hr


     Height                                      5 ft 6 in


     Weight                                      71.3 kg


     Usual Body Weight                           65.771 kg


     Ideal Body Weight (kg)                      64.54


     BMI                                         25.3


     Intake Prior to Admission                   Good


     Weight Status                               Overweight


     Subjective/Other Information                F/U PO/ONS need. Diet advanced


                                                 to pureed with thin liquids


                                                 via SLP. Pt denied offer for


                                                 [place] supplement. Per RN notes


                                                 , pt had an instance of


                                                 vomiting. Pt states that he is


                                                 tolerating pureed diet. Per


                                                 charts pt ate 100


                                                 % meals yesterday and 50%


                                                 breakfast today.


     Percent of energy/protein needs met:        100%/100%


     Burn                                        Absent


     Trauma                                      Absent


     GI Symptoms                                 None


     Food Allergy                                No


     Current % PO                                Good (%)


     Minimum of two criteria                     No


     Body Fat Depletion                          Mild depletion (non-severe)


     #1


      Nutrition Diagnosis                        Increased nutrient needs   (


                                                 specify in comment below)


      Comments:                                  Protein


      Diagnosis Progress(for reassessment        Continues


       documentation)                            


     Is patient on ventilator?                   No


     Is Patient Ambulatory and/or Out of Bed     No


     REE-([facility]-confined to bed)      [phone]


     Calculation Used for Recommendations        [facility]


     Additional Notes                            Protein needs:  g/day (0


                                                 .8-1.5 g/kg/day) CKD and wound


                                                 healing


                                                 Fluid needs: 1 ml/kcal or per


                                                 MD


 Nutrition Intervention


     Change Diet Order:                          Continue Pureed diet with thin


                                                 liquids


     Goal #1                                     Continue to meet at least 75%


                                                 of energy and protein needs


     Anticipated Discharge Needs:                Pureed Diet


     Follow-Up By:                               02/18/20


     Additional Comments                         F/U for stable PO intakes

## 2020-02-13 NOTE — PROGRESS NOTE
Assessment and Plan


Assessment and plan: 


Toxic metabolic encephalopathy


-Multifactorial, sepsis, UTI, cellulitis


-neuro checks


-Continue supportive care





Sepsis


-Persistent leukocytosis


-Consult ID


-Continue IV Abx


-Cultures pending


-Continue supportive care





CKD


-Patient at baseline.  Creatinine June 2019 1.9.


-Avoid nephrotoxic agents


-Renal dose all meds


-strict I/O,


-monitor uop q shift,





Hypernatremia


-Start D5W IV fluids





Acute cystitis


-IV antibiotics continued


-Culture pending


-IV fluids





Vomiting


CXR done neg





Bilateral upper ext swelling


Get doppler US





Dementia


-Reorient as needed


-Supportive care


-Continue home meds once reconciled





Cellulitis buttocks


-Wound care consulted


-On IV ABX





 DVT prophylaxis


-SCDs bilateral extremities


-Heparin subcutanous











History


Interval history: 


Vomiting resolved








Hospitalist Physical





- Physical exam


Narrative exam: 


GEN: Not in acute distress, lying in bed, 


HEENT: Normocephalic, atraumatic, 


Neck: supple, No JVD


Lungs: Clear to auscultation bilat, no wheeze,


heart;S1 and S2 reg, no murmurs, rubs or gallop


Abd:soft, non tender, normal bowel sounds


Ext: Swelling both upper ext, No edema, no clubbing, no cyanosis


Neuro: Awake,alert, 


sadrum: eruythema buttocks, eczematous rash on buttocks,back





- Constitutional


Vitals: 


                                        











Temp Pulse Resp BP Pulse Ox


 


 97.4 F L  103 H  18   134/78   98 


 


 02/13/20 13:20  02/13/20 13:20  02/13/20 13:20  02/13/20 13:20  02/13/20 13:20











General appearance: Present: no acute distress





Results





- Labs


CBC & Chem 7: 


                                 02/13/20 05:16





                                 02/13/20 03:34


Labs: 


                             Laboratory Last Values











WBC  20.1 K/mm3 (4.5-11.0)  H  02/13/20  05:16    


 


RBC  3.39 M/mm3 (3.65-5.03)  L  02/13/20  05:16    


 


Hgb  9.6 gm/dl (11.8-15.2)  L  02/13/20  05:16    


 


Hct  29.4 % (35.5-45.6)  L  02/13/20  05:16    


 


MCV  87 fl (84-94)   02/13/20  05:16    


 


MCH  28 pg (28-32)   02/13/20  05:16    


 


MCHC  33 % (32-34)   02/13/20  05:16    


 


RDW  13.9 % (13.2-15.2)   02/13/20  05:16    


 


Plt Count  466 K/mm3 (140-440)  H  02/13/20  05:16    


 


Lymph % (Auto)  6.8 % (13.4-35.0)  L  02/07/20  22:56    


 


Mono % (Auto)  10.9 % (0.0-7.3)  H  02/07/20  22:56    


 


Eos % (Auto)  9.2 % (0.0-4.3)  H  02/07/20  22:56    


 


Baso % (Auto)  1.1 % (0.0-1.8)   02/07/20  22:56    


 


Lymph #  1.1 K/mm3 (1.2-5.4)  L  02/07/20  22:56    


 


Mono #  1.8 K/mm3 (0.0-0.8)  H  02/07/20  22:56    


 


Eos #  1.5 K/mm3 (0.0-0.4)  H  02/07/20  22:56    


 


Baso #  0.2 K/mm3 (0.0-0.1)  H  02/07/20  22:56    


 


Add Manual Diff  Complete   02/11/20  09:28    


 


Total Counted  100   02/11/20  09:28    


 


Seg Neutrophils %  72.0 % (40.0-70.0)  H  02/07/20  22:56    


 


Seg Neuts % (Manual)  91.0 % (40.0-70.0)  H  02/11/20  09:28    


 


Band Neutrophils %  0 %  02/11/20  09:28    


 


Lymphocytes % (Manual)  2.0 % (13.4-35.0)  L  02/11/20  09:28    


 


Reactive Lymphs % (Man)  0 %  02/11/20  09:28    


 


Monocytes % (Manual)  3.0 % (0.0-7.3)   02/11/20  09:28    


 


Eosinophils % (Manual)  4.0 % (0.0-4.3)   02/11/20  09:28    


 


Basophils % (Manual)  0 % (0.0-1.8)   02/11/20  09:28    


 


Metamyelocytes %  0 %  02/11/20  09:28    


 


Myelocytes %  0 %  02/11/20  09:28    


 


Promyelocytes %  0 %  02/11/20  09:28    


 


Blast Cells %  0 %  02/11/20  09:28    


 


Nucleated RBC %  Not Reportable   02/11/20  09:28    


 


Seg Neutrophils #  11.6 K/mm3 (1.8-7.7)  H  02/07/20  22:56    


 


Seg Neutrophils # Man  17.6 K/mm3 (1.8-7.7)  H  02/11/20  09:28    


 


Band Neutrophils #  0.0 K/mm3  02/11/20  09:28    


 


Lymphocytes # (Manual)  0.4 K/mm3 (1.2-5.4)  L  02/11/20  09:28    


 


Abs React Lymphs (Man)  0.0 K/mm3  02/11/20  09:28    


 


Monocytes # (Manual)  0.6 K/mm3 (0.0-0.8)   02/11/20  09:28    


 


Eosinophils # (Manual)  0.8 K/mm3 (0.0-0.4)  H  02/11/20  09:28    


 


Basophils # (Manual)  0.0 K/mm3 (0.0-0.1)   02/11/20  09:28    


 


Metamyelocytes #  0.0 K/mm3  02/11/20  09:28    


 


Myelocytes #  0.0 K/mm3  02/11/20  09:28    


 


Promyelocytes #  0.0 K/mm3  02/11/20  09:28    


 


Blast Cells #  0.0 K/mm3  02/11/20  09:28    


 


WBC Morphology  Not Reportable   02/11/20  09:28    


 


Hypersegmented Neuts  Not Reportable   02/11/20  09:28    


 


Hyposegmented Neuts  Not Reportable   02/11/20  09:28    


 


Hypogranular Neuts  Not Reportable   02/11/20  09:28    


 


Smudge Cells  Not Reportable   02/11/20  09:28    


 


Toxic Granulation  Not Reportable   02/11/20  09:28    


 


Toxic Vacuolation  Not Reportable   02/11/20  09:28    


 


Dohle Bodies  Not Reportable   02/11/20  09:28    


 


Pelger-Huet Anomaly  Not Reportable   02/11/20  09:28    


 


Bienvenido Rods  Not Reportable   02/11/20  09:28    


 


Platelet Estimate  Consistent w auto   02/11/20  09:28    


 


Clumped Platelets  Not Reportable   02/11/20  09:28    


 


Plt Clumps, EDTA  Not Reportable   02/11/20  09:28    


 


Large Platelets  Not Reportable   02/11/20  09:28    


 


Giant Platelets  Not Reportable   02/11/20  09:28    


 


Platelet Satelliting  Not Reportable   02/11/20  09:28    


 


Plt Morphology Comment  Not Reportable   02/11/20  09:28    


 


RBC Morphology  Not Reportable   02/11/20  09:28    


 


Dimorphic RBCs  Not Reportable   02/11/20  09:28    


 


Polychromasia  Few   02/11/20  09:28    


 


Hypochromasia  Not Reportable   02/11/20  09:28    


 


Poikilocytosis  Not Reportable   02/11/20  09:28    


 


Anisocytosis  Not Reportable   02/11/20  09:28    


 


Microcytosis  Not Reportable   02/11/20  09:28    


 


Macrocytosis  Not Reportable   02/11/20  09:28    


 


Spherocytes  Not Reportable   02/11/20  09:28    


 


Pappenheimer Bodies  Not Reportable   02/11/20  09:28    


 


Sickle Cells  Not Reportable   02/11/20  09:28    


 


Target Cells  Few   02/11/20  09:28    


 


Tear Drop Cells  Not Reportable   02/11/20  09:28    


 


Ovalocytes  Not Reportable   02/11/20  09:28    


 


Helmet Cells  Not Reportable   02/11/20  09:28    


 


Gross-Gracey Bodies  Not Reportable   02/11/20  09:28    


 


Cabot Rings  Not Reportable   02/11/20  09:28    


 


Florence Cells  Not Reportable   02/11/20  09:28    


 


Bite Cells  Not Reportable   02/11/20  09:28    


 


Crenated Cell  Not Reportable   02/11/20  09:28    


 


Elliptocytes  Not Reportable   02/11/20  09:28    


 


Acanthocytes (Spur)  Not Reportable   02/11/20  09:28    


 


Rouleaux  Not Reportable   02/11/20  09:28    


 


Hemoglobin C Crystals  Not Reportable   02/11/20  09:28    


 


Schistocytes  Not Reportable   02/11/20  09:28    


 


Malaria parasites  Not Reportable   02/11/20  09:28    


 


Parker Bodies  Not Reportable   02/11/20  09:28    


 


Hem Pathologist Commnt  No   02/11/20  09:28    


 


Sodium  145 mmol/L (137-145)   02/13/20  03:34    


 


Potassium  4.5 mmol/L (3.6-5.0)   02/13/20  03:34    


 


Chloride  113.3 mmol/L ()  H  02/13/20  03:34    


 


Carbon Dioxide  18 mmol/L (22-30)  L  02/13/20  03:34    


 


Anion Gap  18 mmol/L  02/13/20  03:34    


 


BUN  23 mg/dL (9-20)  H  02/13/20  03:34    


 


Creatinine  1.8 mg/dL (0.8-1.5)  H  02/13/20  03:34    


 


Estimated GFR  44 ml/min  02/13/20  03:34    


 


BUN/Creatinine Ratio  13 %  02/13/20  03:34    


 


Glucose  110 mg/dL ()  H  02/13/20  03:34    


 


POC Glucose  78  ()   02/11/20  12:05    


 


Lactic Acid  1.20 mmol/L (0.7-2.0)   02/08/20  09:24    


 


Calcium  7.9 mg/dL (8.4-10.2)  L  02/13/20  03:34    


 


Total Bilirubin  0.20 mg/dL (0.1-1.2)   02/07/20  22:56    


 


AST  15 units/L (5-40)   02/07/20  22:56    


 


ALT  7 units/L (7-56)   02/07/20  22:56    


 


Alkaline Phosphatase  69 units/L ()   02/07/20  22:56    


 


Troponin T  0.021 ng/mL (0.00-0.029)   02/07/20  22:56    


 


Total Protein  6.9 g/dL (6.3-8.2)   02/07/20  22:56    


 


Albumin  2.4 g/dL (3.9-5)  L  02/07/20  22:56    


 


Albumin/Globulin Ratio  0.5 %  02/07/20  22:56    


 


Urine Color  Yellow  (Yellow)   02/07/20  Unknown


 


Urine Turbidity  Slightly-cloudy  (Clear)   02/07/20  Unknown


 


Urine pH  5.0  (5.0-7.0)   02/07/20  Unknown


 


Ur Specific Gravity  1.013  (1.003-1.030)   02/07/20  Unknown


 


Urine Protein  <15 mg/dl mg/dL (Negative)   02/07/20  Unknown


 


Urine Glucose (UA)  Neg mg/dL (Negative)   02/07/20  Unknown


 


Urine Ketones  Neg mg/dL (Negative)   02/07/20  Unknown


 


Urine Blood  Sm  (Negative)   02/07/20  Unknown


 


Urine Nitrite  Neg  (Negative)   02/07/20  Unknown


 


Urine Bilirubin  Neg  (Negative)   02/07/20  Unknown


 


Urine Urobilinogen  < 2.0 mg/dL (<2.0)   02/07/20  Unknown


 


Ur Leukocyte Esterase  Sm  (Negative)   02/07/20  Unknown


 


Urine WBC (Auto)  19.0 /HPF (0.0-6.0)  H  02/07/20  Unknown


 


Urine RBC (Auto)  4.0 /HPF (0.0-6.0)   02/07/20  Unknown


 


U Epithel Cells (Auto)  < 1.0 /HPF (0-13.0)   02/07/20  Unknown


 


Urine Bacteria (Auto)  1+ /HPF (Negative)   02/07/20  Unknown


 


Hyaline Casts  1 /LPF  02/07/20  Unknown


 


Urine Mucus  Few /HPF  02/07/20  Unknown


 


Blood Type  O POSITIVE   02/08/20  02:30    


 


Antibody Screen  Negative   02/08/20  02:30    














Active Medications





- Current Medications


Current Medications: 














Generic Name Dose Route Start Last Admin





  Trade Name Freq  PRN Reason Stop Dose Admin


 


Acetaminophen  650 mg  02/08/20 00:43  02/12/20 20:48





  Tylenol  PO   650 mg





  Q4H PRN   Administration





  Pain MILD(1-3)/Fever >100.5/HA  


 


Diphenhydramine HCl  25 mg  02/08/20 18:44  02/13/20 12:48





  Benadryl  PO   25 mg





  Q6H PRN   Administration





  Itching  


 


Heparin Sodium (Porcine)  5,000 unit  02/08/20 10:00  02/13/20 09:54





  Heparin  SUB-Q   5,000 unit





  Q12HR DACIA   Administration


 


Dextrose  1,000 mls @ 75 mls/hr  02/10/20 09:00  02/13/20 06:49





  D5w  IV   75 mls/hr





  AS DIRECT DACIA   Administration


 


Metronidazole  500 mg in 100 mls @ 100 mls/hr  02/13/20 14:00 





  Flagyl 500 Mg/100 Ml  IV  





  Q8HR DACIA  





  Protocol  


 


Cefazolin Sodium 2 gm/ Sodium  100 mls @ 200 mls/hr  02/13/20 15:00 





  Chloride  IV  





  Q8HR DACIA  


 


Ondansetron HCl  4 mg  02/08/20 00:43  02/10/20 12:21





  Zofran  IV   4 mg





  Q8H PRN   Administration





  Nausea And Vomiting  


 


Sodium Chloride  10 ml  02/08/20 10:00  02/13/20 09:54





  Sodium Chloride Flush Syringe 10 Ml  IV   10 ml





  BID DACIA   Administration


 


Sodium Chloride  10 ml  02/08/20 00:43 





  Sodium Chloride Flush Syringe 10 Ml  IV  





  PRN PRN  





  LINE FLUSH  


 


Triamcinolone Acetonide  1 applic  02/10/20 22:00  02/13/20 09:53





  Kenalog  TP   1 applic





  BID DACIA   Administration














Nutrition/Malnutrition Assess





- Dietary Evaluation


Nutrition/Malnutrition Findings: 


                                        





Nutrition Notes                                            Start:  02/08/20 

11:12


Freq:                                                      Status: Active       




Protocol:                                                                       




 Document     02/13/20 12:04  CW  (Rec: 02/13/20 12:17  CW  [id number])


 Co-Sign      02/13/20 12:04  LP


 Nutrition Notes


     Initial or Follow up                        Reassessment


     Current Diagnosis                           CKD(stage I-IV),Sepsis,


                                                 Hypertension,Stroke


     Other Pertinent Diagnosis                   AMS, dementia, Acute Metabolic


                                                 Encephalopathy, wounds


     Current Diet                                Regular Pureed Diet


     Labs/Tests                                  BUN 23


                                                 Cr 1.8


                                                 


     Pertinent Medications                       D5w at 75 ml/hr


     Height                                      5 ft 6 in


     Weight                                      71.3 kg


     Usual Body Weight                           65.771 kg


     Ideal Body Weight (kg)                      64.54


     BMI                                         25.3


     Intake Prior to Admission                   Good


     Weight Status                               Overweight


     Subjective/Other Information                F/U PO/ONS need. Diet advanced


                                                 to pureed with thin liquids


                                                 via SLP. Pt denied offer for


                                                 Landon supplement. Per RN notes


                                                 , pt had an instance of


                                                 vomiting. Pt states that he is


                                                 tolerating pureed diet. Per


                                                 charts pt ate 100


                                                 % meals yesterday and 50%


                                                 breakfast today.


     Percent of energy/protein needs met:        100%/100%


     Burn                                        Absent


     Trauma                                      Absent


     GI Symptoms                                 None


     Food Allergy                                No


     Current % PO                                Good (%)


     Minimum of two criteria                     No


     Body Fat Depletion                          Mild depletion (non-severe)


     #1


      Nutrition Diagnosis                        Increased nutrient needs   (


                                                 specify in comment below)


      Comments:                                  Protein


      Diagnosis Progress(for reassessment        Continues


       documentation)                            


     Is patient on ventilator?                   No


     Is Patient Ambulatory and/or Out of Bed     No


     REE-(Sawyer-St. Jeor-confined to bed)      1610.304


     Calculation Used for Recommendations        [facility]


     Additional Notes                            Protein needs:  g/day (0


                                                 .8-1.5 g/kg/day) CKD and wound


                                                 healing


                                                 Fluid needs: 1 ml/kcal or per


                                                 MD


 Nutrition Intervention


     Change Diet Order:                          Continue Pureed diet with thin


                                                 liquids


     Goal #1                                     Continue to meet at least 75%


                                                 of energy and protein needs


     Anticipated Discharge Needs:                Pureed Diet


     Follow-Up By:                               02/18/20


     Additional Comments                         F/U for stable PO intakes

## 2020-02-14 LAB
HCT VFR BLD CALC: 29.8 % (ref 35.5–45.6)
HGB BLD-MCNC: 9.8 GM/DL (ref 11.8–15.2)
MCHC RBC AUTO-ENTMCNC: 33 % (ref 32–34)
MCV RBC AUTO: 87 FL (ref 84–94)
PLATELET # BLD: 450 K/MM3 (ref 140–440)
RBC # BLD AUTO: 3.43 M/MM3 (ref 3.65–5.03)

## 2020-02-14 RX ADMIN — HEPARIN SODIUM SCH UNIT: 5000 INJECTION, SOLUTION INTRAVENOUS; SUBCUTANEOUS at 22:08

## 2020-02-14 RX ADMIN — METRONIDAZOLE SCH MLS/HR: 5 INJECTION, SOLUTION INTRAVENOUS at 13:46

## 2020-02-14 RX ADMIN — HEPARIN SODIUM SCH UNIT: 5000 INJECTION, SOLUTION INTRAVENOUS; SUBCUTANEOUS at 10:02

## 2020-02-14 RX ADMIN — DEXTROSE SCH MLS/HR: 5 SOLUTION INTRAVENOUS at 18:42

## 2020-02-14 RX ADMIN — TRIAMCINOLONE ACETONIDE SCH APPLIC: 1 CREAM TOPICAL at 22:08

## 2020-02-14 RX ADMIN — METRONIDAZOLE SCH MLS/HR: 5 INJECTION, SOLUTION INTRAVENOUS at 22:08

## 2020-02-14 RX ADMIN — Medication SCH ML: at 22:13

## 2020-02-14 RX ADMIN — Medication SCH ML: at 10:03

## 2020-02-14 RX ADMIN — TRIAMCINOLONE ACETONIDE SCH APPLIC: 1 CREAM TOPICAL at 10:02

## 2020-02-14 RX ADMIN — METRONIDAZOLE SCH MLS/HR: 5 INJECTION, SOLUTION INTRAVENOUS at 06:19

## 2020-02-14 NOTE — PROGRESS NOTE
Assessment and Plan





Cultures:


2/7/2020 urine cultures: no growth


2/7/2020 blood cultures: pending


2/9/2020 urine cultures: pending


MRSA swab: negative





A&P: 87 yo M PMhx dementia, CKD, CVA admitted with altered mental status. 





#SIRS: presents on admission with fever and leukocytosis. Unclear etiology at 

this point, had mild pyuria on UA however cultures have been negative. Blood 

cultures negative as well. CXR without focal abnormality. Recommend ruling out 

flu given fevers, cough. Has significant eczema over his back and buttocks, with

probable superinfection. 


#Cellulitis: Continue cefazolin


#Pyuria: negligible white cells in urine, cultures negative


#CKD: renally dose antibiotics


#Dementia





Recommendations:


- continue cefazolin/Flagyl. 


- follow up flu swab





Thank you for the consult, will continue to follow





Dr. Carr covering this weekend





LIZZY Garcia MD


[facility] ([facility]


M: [phone]


O: [phone]


F: [phone] 





Subjective


Date of service: 02/14/20


Interval history: 





Afebrile, persistently elevated white count. No new complaints, at baseline 

confusion. denies any symptoms including pain over his buttocks. 





Objective





- Exam


Narrative Exam: 





Physical Exam: 


Constitutional: Alert, pleasantly confused. No acute distress


Cardiovascular: S1, S2 normal. 


Respiratory: Good air entry, clear to auscultation bilaterally


GI: Soft, non-tender; bowel sounds normal. No peritoneal signs. 


Musculoskeletal: No pedal edema, no cyanosis.


Skin: Generalized redness over back+buttocks with diffuse dry skin. 


Hem/Lymphatic: No palpable cervical or supraclavicular nodes. No lymphangitis


Psych: Mood ok. Affect normal


Neurological: Awake, alert, disoriented. No gross abnormality





- Constitutional


Vitals: 


                                   Vital Signs











Temp Pulse Resp BP Pulse Ox


 


 99.2 F   73   18   143/73   98 


 


 02/14/20 07:26  02/14/20 07:26  02/14/20 08:18  02/14/20 07:26  02/14/20 07:26








                           Temperature -Last 24 Hours











Temperature                    99.2 F


 


Temperature                    99.4 F


 


Temperature                    100.0 F


 


Temperature                    97.4 F

















- Labs


CBC & Chem 7: 


                                 02/14/20 05:40





                                 02/13/20 03:34


Labs: 


                              Abnormal lab results











  02/14/20 Range/Units





  05:40 


 


WBC  20.4 H  (4.5-11.0)  K/mm3


 


RBC  3.43 L  (3.65-5.03)  M/mm3


 


Hgb  9.8 L  (11.8-15.2)  gm/dl


 


Hct  29.8 L  (35.5-45.6)  %


 


Plt Count  450 H  (140-440)  K/mm3

## 2020-02-14 NOTE — PROGRESS NOTE
Assessment and Plan


Assessment and plan: 


Toxic metabolic encephalopathy


-Multifactorial, sepsis, UTI, cellulitis


-neuro checks


-Continue supportive care





Sepsis


-Persistent leukocytosis


-Consulted ID, discussed with Dr. Garcia


-Now started on Cefaxzolin and Flagyl. 


 Unasyn discontinued


-Continue supportive care





leukocytosis


WBC 20.4 today





CKD


-Patient at baseline.  Creatinine June 2019 1.9.


-Avoid nephrotoxic agents


-Renal dose all meds


-strict I/O,


-monitor uop q shift,





Hypernatremia


-Start D5W IV fluids





Acute cystitis


-IV antibiotics continued


-Culture pending


-IV fluids





Vomiting


CXR done neg





Bilateral upper ext swelling


Get doppler US





Dementia


-Reorient as needed


-Supportive care


-Continue home meds once reconciled





Cellulitis buttocks


-Wound care consulted


-On IV ABX





 DVT prophylaxis


-SCDs bilateral extremities


-Heparin subcutanous





2/13 WBC still elevated. Dr. Garcia started Cefazolin and Flagyl. Stopped 

Unasyn.


2/14 WBC higher today 20.4 so not stable for discharge. I discussed with Dr. Garcia











History


Interval history: 


Vomiting resolved


still has redness buttocks








Hospitalist Physical





- Physical exam


Narrative exam: 


GEN: Not in acute distress, lying in bed, 


HEENT: Normocephalic, atraumatic, 


Neck: supple, No JVD


Lungs: Clear to auscultation bilat, no wheeze,


heart;S1 and S2 reg, no murmurs, rubs or gallop


Abd:soft, non tender, normal bowel sounds


Ext: Swelling both upper ext, No edema, no clubbing, no cyanosis


Neuro: Awake,alert, 


sadrum: erythema buttocks, eczematous rash on buttocks,back





- Constitutional


Vitals: 


                                        











Temp Pulse Resp BP Pulse Ox


 


 99.2 F   73   18   143/73   98 


 


 02/14/20 07:26  02/14/20 07:26  02/14/20 08:18  02/14/20 07:26  02/14/20 07:26











General appearance: Present: no acute distress





Results





- Labs


CBC & Chem 7: 


                                 02/14/20 05:40





                                 02/13/20 03:34


Labs: 


                             Laboratory Last Values











WBC  20.4 K/mm3 (4.5-11.0)  H  02/14/20  05:40    


 


RBC  3.43 M/mm3 (3.65-5.03)  L  02/14/20  05:40    


 


Hgb  9.8 gm/dl (11.8-15.2)  L  02/14/20  05:40    


 


Hct  29.8 % (35.5-45.6)  L  02/14/20  05:40    


 


MCV  87 fl (84-94)   02/14/20  05:40    


 


MCH  29 pg (28-32)   02/14/20  05:40    


 


MCHC  33 % (32-34)   02/14/20  05:40    


 


RDW  13.7 % (13.2-15.2)   02/14/20  05:40    


 


Plt Count  450 K/mm3 (140-440)  H  02/14/20  05:40    


 


Lymph % (Auto)  6.8 % (13.4-35.0)  L  02/07/20  22:56    


 


Mono % (Auto)  10.9 % (0.0-7.3)  H  02/07/20  22:56    


 


Eos % (Auto)  9.2 % (0.0-4.3)  H  02/07/20  22:56    


 


Baso % (Auto)  1.1 % (0.0-1.8)   02/07/20  22:56    


 


Lymph #  1.1 K/mm3 (1.2-5.4)  L  02/07/20  22:56    


 


Mono #  1.8 K/mm3 (0.0-0.8)  H  02/07/20  22:56    


 


Eos #  1.5 K/mm3 (0.0-0.4)  H  02/07/20  22:56    


 


Baso #  0.2 K/mm3 (0.0-0.1)  H  02/07/20  22:56    


 


Add Manual Diff  Complete   02/11/20  09:28    


 


Total Counted  100   02/11/20  09:28    


 


Seg Neutrophils %  72.0 % (40.0-70.0)  H  02/07/20  22:56    


 


Seg Neuts % (Manual)  91.0 % (40.0-70.0)  H  02/11/20  09:28    


 


Band Neutrophils %  0 %  02/11/20  09:28    


 


Lymphocytes % (Manual)  2.0 % (13.4-35.0)  L  02/11/20  09:28    


 


Reactive Lymphs % (Man)  0 %  02/11/20  09:28    


 


Monocytes % (Manual)  3.0 % (0.0-7.3)   02/11/20  09:28    


 


Eosinophils % (Manual)  4.0 % (0.0-4.3)   02/11/20  09:28    


 


Basophils % (Manual)  0 % (0.0-1.8)   02/11/20  09:28    


 


Metamyelocytes %  0 %  02/11/20  09:28    


 


Myelocytes %  0 %  02/11/20  09:28    


 


Promyelocytes %  0 %  02/11/20  09:28    


 


Blast Cells %  0 %  02/11/20  09:28    


 


Nucleated RBC %  Not Reportable   02/11/20  09:28    


 


Seg Neutrophils #  11.6 K/mm3 (1.8-7.7)  H  02/07/20  22:56    


 


Seg Neutrophils # Man  17.6 K/mm3 (1.8-7.7)  H  02/11/20  09:28    


 


Band Neutrophils #  0.0 K/mm3  02/11/20  09:28    


 


Lymphocytes # (Manual)  0.4 K/mm3 (1.2-5.4)  L  02/11/20  09:28    


 


Abs React Lymphs (Man)  0.0 K/mm3  02/11/20  09:28    


 


Monocytes # (Manual)  0.6 K/mm3 (0.0-0.8)   02/11/20  09:28    


 


Eosinophils # (Manual)  0.8 K/mm3 (0.0-0.4)  H  02/11/20  09:28    


 


Basophils # (Manual)  0.0 K/mm3 (0.0-0.1)   02/11/20  09:28    


 


Metamyelocytes #  0.0 K/mm3  02/11/20  09:28    


 


Myelocytes #  0.0 K/mm3  02/11/20  09:28    


 


Promyelocytes #  0.0 K/mm3  02/11/20  09:28    


 


Blast Cells #  0.0 K/mm3  02/11/20  09:28    


 


WBC Morphology  Not Reportable   02/11/20  09:28    


 


Hypersegmented Neuts  Not Reportable   02/11/20  09:28    


 


Hyposegmented Neuts  Not Reportable   02/11/20  09:28    


 


Hypogranular Neuts  Not Reportable   02/11/20  09:28    


 


Smudge Cells  Not Reportable   02/11/20  09:28    


 


Toxic Granulation  Not Reportable   02/11/20  09:28    


 


Toxic Vacuolation  Not Reportable   02/11/20  09:28    


 


Dohle Bodies  Not Reportable   02/11/20  09:28    


 


Pelger-Huet Anomaly  Not Reportable   02/11/20  09:28    


 


Bienvenido Rods  Not Reportable   02/11/20  09:28    


 


Platelet Estimate  Consistent w auto   02/11/20  09:28    


 


Clumped Platelets  Not Reportable   02/11/20  09:28    


 


Plt Clumps, EDTA  Not Reportable   02/11/20  09:28    


 


Large Platelets  Not Reportable   02/11/20  09:28    


 


Giant Platelets  Not Reportable   02/11/20  09:28    


 


Platelet Satelliting  Not Reportable   02/11/20  09:28    


 


Plt Morphology Comment  Not Reportable   02/11/20  09:28    


 


RBC Morphology  Not Reportable   02/11/20  09:28    


 


Dimorphic RBCs  Not Reportable   02/11/20  09:28    


 


Polychromasia  Few   02/11/20  09:28    


 


Hypochromasia  Not Reportable   02/11/20  09:28    


 


Poikilocytosis  Not Reportable   02/11/20  09:28    


 


Anisocytosis  Not Reportable   02/11/20  09:28    


 


Microcytosis  Not Reportable   02/11/20  09:28    


 


Macrocytosis  Not Reportable   02/11/20  09:28    


 


Spherocytes  Not Reportable   02/11/20  09:28    


 


Pappenheimer Bodies  Not Reportable   02/11/20  09:28    


 


Sickle Cells  Not Reportable   02/11/20  09:28    


 


Target Cells  Few   02/11/20  09:28    


 


Tear Drop Cells  Not Reportable   02/11/20  09:28    


 


Ovalocytes  Not Reportable   02/11/20  09:28    


 


Helmet Cells  Not Reportable   02/11/20  09:28    


 


[place] Bodies  Not Reportable   02/11/20  09:28    


 


Cabot Rings  Not Reportable   02/11/20  09:28    


 


Vikki Cells  Not Reportable   02/11/20  09:28    


 


Bite Cells  Not Reportable   02/11/20  09:28    


 


Crenated Cell  Not Reportable   02/11/20  09:28    


 


Elliptocytes  Not Reportable   02/11/20  09:28    


 


Acanthocytes (Spur)  Not Reportable   02/11/20  09:28    


 


Rouleaux  Not Reportable   02/11/20  09:28    


 


Hemoglobin C Crystals  Not Reportable   02/11/20  09:28    


 


Schistocytes  Not Reportable   02/11/20  09:28    


 


Malaria parasites  Not Reportable   02/11/20  09:28    


 


Parker Bodies  Not Reportable   02/11/20  09:28    


 


Hem Pathologist Commnt  No   02/11/20  09:28    


 


Sodium  145 mmol/L (137-145)   02/13/20  03:34    


 


Potassium  4.5 mmol/L (3.6-5.0)   02/13/20  03:34    


 


Chloride  113.3 mmol/L ()  H  02/13/20  03:34    


 


Carbon Dioxide  18 mmol/L (22-30)  L  02/13/20  03:34    


 


Anion Gap  18 mmol/L  02/13/20  03:34    


 


BUN  23 mg/dL (9-20)  H  02/13/20  03:34    


 


Creatinine  1.8 mg/dL (0.8-1.5)  H  02/13/20  03:34    


 


Estimated GFR  44 ml/min  02/13/20  03:34    


 


BUN/Creatinine Ratio  13 %  02/13/20  03:34    


 


Glucose  110 mg/dL ()  H  02/13/20  03:34    


 


POC Glucose  78  ()   02/11/20  12:05    


 


Lactic Acid  1.20 mmol/L (0.7-2.0)   02/08/20  09:24    


 


Calcium  7.9 mg/dL (8.4-10.2)  L  02/13/20  03:34    


 


Total Bilirubin  0.20 mg/dL (0.1-1.2)   02/07/20  22:56    


 


AST  15 units/L (5-40)   02/07/20  22:56    


 


ALT  7 units/L (7-56)   02/07/20  22:56    


 


Alkaline Phosphatase  69 units/L ()   02/07/20  22:56    


 


Troponin T  0.021 ng/mL (0.00-0.029)   02/07/20  22:56    


 


Total Protein  6.9 g/dL (6.3-8.2)   02/07/20  22:56    


 


Albumin  2.4 g/dL (3.9-5)  L  02/07/20  22:56    


 


Albumin/Globulin Ratio  0.5 %  02/07/20  22:56    


 


Urine Color  Yellow  (Yellow)   02/07/20  Unknown


 


Urine Turbidity  Slightly-cloudy  (Clear)   02/07/20  Unknown


 


Urine pH  5.0  (5.0-7.0)   02/07/20  Unknown


 


Ur Specific Gravity  1.013  (1.003-1.030)   02/07/20  Unknown


 


Urine Protein  <15 mg/dl mg/dL (Negative)   02/07/20  Unknown


 


Urine Glucose (UA)  Neg mg/dL (Negative)   02/07/20  Unknown


 


Urine Ketones  Neg mg/dL (Negative)   02/07/20  Unknown


 


Urine Blood  Sm  (Negative)   02/07/20  Unknown


 


Urine Nitrite  Neg  (Negative)   02/07/20  Unknown


 


Urine Bilirubin  Neg  (Negative)   02/07/20  Unknown


 


Urine Urobilinogen  < 2.0 mg/dL (<2.0)   02/07/20  Unknown


 


Ur Leukocyte Esterase  Sm  (Negative)   02/07/20  Unknown


 


Urine WBC (Auto)  19.0 /HPF (0.0-6.0)  H  02/07/20  Unknown


 


Urine RBC (Auto)  4.0 /HPF (0.0-6.0)   02/07/20  Unknown


 


U Epithel Cells (Auto)  < 1.0 /HPF (0-13.0)   02/07/20  Unknown


 


Urine Bacteria (Auto)  1+ /HPF (Negative)   02/07/20  Unknown


 


Hyaline Casts  1 /LPF  02/07/20  Unknown


 


Urine Mucus  Few /HPF  02/07/20  Unknown


 


Blood Type  O POSITIVE   02/08/20  02:30    


 


Antibody Screen  Negative   02/08/20  02:30    














Active Medications





- Current Medications


Current Medications: 














Generic Name Dose Route Start Last Admin





  Trade Name Freq  PRN Reason Stop Dose Admin


 


Acetaminophen  650 mg  02/08/20 00:43  02/12/20 20:48





  Tylenol  PO   650 mg





  Q4H PRN   Administration





  Pain MILD(1-3)/Fever >100.5/HA  


 


Diphenhydramine HCl  25 mg  02/08/20 18:44  02/13/20 12:48





  Benadryl  PO   25 mg





  Q6H PRN   Administration





  Itching  


 


Heparin Sodium (Porcine)  5,000 unit  02/08/20 10:00  02/14/20 10:02





  Heparin  SUB-Q   5,000 unit





  Q12HR DACIA   Administration


 


Dextrose  1,000 mls @ 75 mls/hr  02/10/20 09:00  02/13/20 06:49





  D5w  IV   75 mls/hr





  AS DIRECT DACIA   Administration


 


Metronidazole  500 mg in 100 mls @ 100 mls/hr  02/13/20 14:00  02/14/20 06:19





  Flagyl 500 Mg/100 Ml  IV   100 mls/hr





  Q8HR DACIA   Administration





  Protocol  


 


Cefazolin Sodium 2 gm/ Sodium  100 mls @ 200 mls/hr  02/14/20 22:00 





  Chloride  IV  





  Q12HR DACIA  


 


Ondansetron HCl  4 mg  02/08/20 00:43  02/10/20 12:21





  Zofran  IV   4 mg





  Q8H PRN   Administration





  Nausea And Vomiting  


 


Sodium Chloride  10 ml  02/08/20 10:00  02/14/20 10:03





  Sodium Chloride Flush Syringe 10 Ml  IV   10 ml





  BID DACIA   Administration


 


Sodium Chloride  10 ml  02/08/20 00:43 





  Sodium Chloride Flush Syringe 10 Ml  IV  





  PRN PRN  





  LINE FLUSH  


 


Triamcinolone Acetonide  1 applic  02/10/20 22:00  02/14/20 10:02





  Kenalog  TP   1 applic





  BID DACIA   Administration














Nutrition/Malnutrition Assess





- Dietary Evaluation


Nutrition/Malnutrition Findings: 


                                        





Nutrition Notes                                            Start:  02/08/20 

11:12


Freq:                                                      Status: Active       




Protocol:                                                                       




 Document     02/13/20 12:04  CW  (Rec: 02/13/20 12:17  CW  [id number])


 Co-Sign      02/13/20 12:04  LP


 Nutrition Notes


     Initial or Follow up                        Reassessment


     Current Diagnosis                           CKD(stage I-IV),Sepsis,


                                                 Hypertension,Stroke


     Other Pertinent Diagnosis                   AMS, dementia, Acute Metabolic


                                                 Encephalopathy, wounds


     Current Diet                                Regular Pureed Diet


     Labs/Tests                                  BUN 23


                                                 Cr 1.8


                                                 


     Pertinent Medications                       D5w at 75 ml/hr


     Height                                      5 ft 6 in


     Weight                                      71.3 kg


     Usual Body Weight                           65.771 kg


     Ideal Body Weight (kg)                      64.54


     BMI                                         25.3


     Intake Prior to Admission                   Good


     Weight Status                               Overweight


     Subjective/Other Information                F/U PO/ONS need. Diet advanced


                                                 to pureed with thin liquids


                                                 via SLP. Pt denied offer for


                                                 [place] supplement. Per RN notes


                                                 , pt had an instance of


                                                 vomiting. Pt states that he is


                                                 tolerating pureed diet. Per


                                                 charts pt ate 100


                                                 % meals yesterday and 50%


                                                 breakfast today.


     Percent of energy/protein needs met:        100%/100%


     Burn                                        Absent


     Trauma                                      Absent


     GI Symptoms                                 None


     Food Allergy                                No


     Current % PO                                Good (%)


     Minimum of two criteria                     No


     Body Fat Depletion                          Mild depletion (non-severe)


     #1


      Nutrition Diagnosis                        Increased nutrient needs   (


                                                 specify in comment below)


      Comments:                                  Protein


      Diagnosis Progress(for reassessment        Continues


       documentation)                            


     Is patient on ventilator?                   No


     Is Patient Ambulatory and/or Out of Bed     No


     REE-([facility]-confined to bed)      [phone]


     Calculation Used for Recommendations        [facility]


     Additional Notes                            Protein needs:  g/day (0


                                                 .8-1.5 g/kg/day) CKD and wound


                                                 healing


                                                 Fluid needs: 1 ml/kcal or per


                                                 MD


 Nutrition Intervention


     Change Diet Order:                          Continue Pureed diet with thin


                                                 liquids


     Goal #1                                     Continue to meet at least 75%


                                                 of energy and protein needs


     Anticipated Discharge Needs:                Pureed Diet


     Follow-Up By:                               02/18/20


     Additional Comments                         F/U for stable PO intakes

## 2020-02-15 LAB
HCT VFR BLD CALC: 28.4 % (ref 35.5–45.6)
HGB BLD-MCNC: 9.4 GM/DL (ref 11.8–15.2)
MCHC RBC AUTO-ENTMCNC: 33 % (ref 32–34)
MCV RBC AUTO: 86 FL (ref 84–94)
PLATELET # BLD: 423 K/MM3 (ref 140–440)
RBC # BLD AUTO: 3.3 M/MM3 (ref 3.65–5.03)

## 2020-02-15 RX ADMIN — METRONIDAZOLE SCH MLS/HR: 5 INJECTION, SOLUTION INTRAVENOUS at 22:14

## 2020-02-15 RX ADMIN — Medication SCH ML: at 22:19

## 2020-02-15 RX ADMIN — TRIAMCINOLONE ACETONIDE SCH APPLIC: 1 CREAM TOPICAL at 22:21

## 2020-02-15 RX ADMIN — METRONIDAZOLE SCH MLS/HR: 5 INJECTION, SOLUTION INTRAVENOUS at 05:35

## 2020-02-15 RX ADMIN — TRIAMCINOLONE ACETONIDE SCH APPLIC: 1 CREAM TOPICAL at 09:39

## 2020-02-15 RX ADMIN — HEPARIN SODIUM SCH UNIT: 5000 INJECTION, SOLUTION INTRAVENOUS; SUBCUTANEOUS at 09:39

## 2020-02-15 RX ADMIN — DEXTROSE SCH MLS/HR: 5 SOLUTION INTRAVENOUS at 14:36

## 2020-02-15 RX ADMIN — HEPARIN SODIUM SCH UNIT: 5000 INJECTION, SOLUTION INTRAVENOUS; SUBCUTANEOUS at 22:18

## 2020-02-15 RX ADMIN — Medication SCH ML: at 09:39

## 2020-02-15 RX ADMIN — METRONIDAZOLE SCH MLS/HR: 5 INJECTION, SOLUTION INTRAVENOUS at 13:25

## 2020-02-15 NOTE — PROGRESS NOTE
Hospitalist Physical





- Constitutional


Vitals: 


                                        











Temp Pulse Resp BP Pulse Ox


 


 98.4 F   66   18   131/73   99 


 


 02/15/20 07:37  02/15/20 10:00  02/15/20 10:00  02/15/20 07:37  02/15/20 10:00











General appearance: Present: no acute distress





Results





- Labs


CBC & Chem 7: 


                                 02/15/20 04:58





                                 02/13/20 03:34


Labs: 


                             Laboratory Last Values











WBC  16.2 K/mm3 (4.5-11.0)  H  02/15/20  04:58    


 


RBC  3.30 M/mm3 (3.65-5.03)  L  02/15/20  04:58    


 


Hgb  9.4 gm/dl (11.8-15.2)  L  02/15/20  04:58    


 


Hct  28.4 % (35.5-45.6)  L  02/15/20  04:58    


 


MCV  86 fl (84-94)   02/15/20  04:58    


 


MCH  28 pg (28-32)   02/15/20  04:58    


 


MCHC  33 % (32-34)   02/15/20  04:58    


 


RDW  14.0 % (13.2-15.2)   02/15/20  04:58    


 


Plt Count  423 K/mm3 (140-440)   02/15/20  04:58    


 


Lymph % (Auto)  6.8 % (13.4-35.0)  L  02/07/20  22:56    


 


Mono % (Auto)  10.9 % (0.0-7.3)  H  02/07/20  22:56    


 


Eos % (Auto)  9.2 % (0.0-4.3)  H  02/07/20  22:56    


 


Baso % (Auto)  1.1 % (0.0-1.8)   02/07/20  22:56    


 


Lymph #  1.1 K/mm3 (1.2-5.4)  L  02/07/20  22:56    


 


Mono #  1.8 K/mm3 (0.0-0.8)  H  02/07/20  22:56    


 


Eos #  1.5 K/mm3 (0.0-0.4)  H  02/07/20  22:56    


 


Baso #  0.2 K/mm3 (0.0-0.1)  H  02/07/20  22:56    


 


Add Manual Diff  Complete   02/11/20  09:28    


 


Total Counted  100   02/11/20  09:28    


 


Seg Neutrophils %  72.0 % (40.0-70.0)  H  02/07/20  22:56    


 


Seg Neuts % (Manual)  91.0 % (40.0-70.0)  H  02/11/20  09:28    


 


Band Neutrophils %  0 %  02/11/20  09:28    


 


Lymphocytes % (Manual)  2.0 % (13.4-35.0)  L  02/11/20  09:28    


 


Reactive Lymphs % (Man)  0 %  02/11/20  09:28    


 


Monocytes % (Manual)  3.0 % (0.0-7.3)   02/11/20  09:28    


 


Eosinophils % (Manual)  4.0 % (0.0-4.3)   02/11/20  09:28    


 


Basophils % (Manual)  0 % (0.0-1.8)   02/11/20  09:28    


 


Metamyelocytes %  0 %  02/11/20  09:28    


 


Myelocytes %  0 %  02/11/20  09:28    


 


Promyelocytes %  0 %  02/11/20  09:28    


 


Blast Cells %  0 %  02/11/20  09:28    


 


Nucleated RBC %  Not Reportable   02/11/20  09:28    


 


Seg Neutrophils #  11.6 K/mm3 (1.8-7.7)  H  02/07/20  22:56    


 


Seg Neutrophils # Man  17.6 K/mm3 (1.8-7.7)  H  02/11/20  09:28    


 


Band Neutrophils #  0.0 K/mm3  02/11/20  09:28    


 


Lymphocytes # (Manual)  0.4 K/mm3 (1.2-5.4)  L  02/11/20  09:28    


 


Abs React Lymphs (Man)  0.0 K/mm3  02/11/20  09:28    


 


Monocytes # (Manual)  0.6 K/mm3 (0.0-0.8)   02/11/20  09:28    


 


Eosinophils # (Manual)  0.8 K/mm3 (0.0-0.4)  H  02/11/20  09:28    


 


Basophils # (Manual)  0.0 K/mm3 (0.0-0.1)   02/11/20  09:28    


 


Metamyelocytes #  0.0 K/mm3  02/11/20  09:28    


 


Myelocytes #  0.0 K/mm3  02/11/20  09:28    


 


Promyelocytes #  0.0 K/mm3  02/11/20  09:28    


 


Blast Cells #  0.0 K/mm3  02/11/20  09:28    


 


WBC Morphology  Not Reportable   02/11/20  09:28    


 


Hypersegmented Neuts  Not Reportable   02/11/20  09:28    


 


Hyposegmented Neuts  Not Reportable   02/11/20  09:28    


 


Hypogranular Neuts  Not Reportable   02/11/20  09:28    


 


Smudge Cells  Not Reportable   02/11/20  09:28    


 


Toxic Granulation  Not Reportable   02/11/20  09:28    


 


Toxic Vacuolation  Not Reportable   02/11/20  09:28    


 


Dohle Bodies  Not Reportable   02/11/20  09:28    


 


Pelger-Huet Anomaly  Not Reportable   02/11/20  09:28    


 


Bienvenido Rods  Not Reportable   02/11/20  09:28    


 


Platelet Estimate  Consistent w auto   02/11/20  09:28    


 


Clumped Platelets  Not Reportable   02/11/20  09:28    


 


Plt Clumps, EDTA  Not Reportable   02/11/20  09:28    


 


Large Platelets  Not Reportable   02/11/20  09:28    


 


Giant Platelets  Not Reportable   02/11/20  09:28    


 


Platelet Satelliting  Not Reportable   02/11/20  09:28    


 


Plt Morphology Comment  Not Reportable   02/11/20  09:28    


 


RBC Morphology  Not Reportable   02/11/20  09:28    


 


Dimorphic RBCs  Not Reportable   02/11/20  09:28    


 


Polychromasia  Few   02/11/20  09:28    


 


Hypochromasia  Not Reportable   02/11/20  09:28    


 


Poikilocytosis  Not Reportable   02/11/20  09:28    


 


Anisocytosis  Not Reportable   02/11/20  09:28    


 


Microcytosis  Not Reportable   02/11/20  09:28    


 


Macrocytosis  Not Reportable   02/11/20  09:28    


 


Spherocytes  Not Reportable   02/11/20  09:28    


 


Pappenheimer Bodies  Not Reportable   02/11/20  09:28    


 


Sickle Cells  Not Reportable   02/11/20  09:28    


 


Target Cells  Few   02/11/20  09:28    


 


Tear Drop Cells  Not Reportable   02/11/20  09:28    


 


Ovalocytes  Not Reportable   02/11/20  09:28    


 


Helmet Cells  Not Reportable   02/11/20  09:28    


 


Gross-Erskine Bodies  Not Reportable   02/11/20  09:28    


 


Cabot Rings  Not Reportable   02/11/20  09:28    


 


Vikki Cells  Not Reportable   02/11/20  09:28    


 


Bite Cells  Not Reportable   02/11/20  09:28    


 


Crenated Cell  Not Reportable   02/11/20  09:28    


 


Elliptocytes  Not Reportable   02/11/20  09:28    


 


Acanthocytes (Spur)  Not Reportable   02/11/20  09:28    


 


Rouleaux  Not Reportable   02/11/20  09:28    


 


Hemoglobin C Crystals  Not Reportable   02/11/20  09:28    


 


Schistocytes  Not Reportable   02/11/20  09:28    


 


Malaria parasites  Not Reportable   02/11/20  09:28    


 


Parker Bodies  Not Reportable   02/11/20  09:28    


 


Hem Pathologist Commnt  No   02/11/20  09:28    


 


Sodium  145 mmol/L (137-145)   02/13/20  03:34    


 


Potassium  4.5 mmol/L (3.6-5.0)   02/13/20  03:34    


 


Chloride  113.3 mmol/L ()  H  02/13/20  03:34    


 


Carbon Dioxide  18 mmol/L (22-30)  L  02/13/20  03:34    


 


Anion Gap  18 mmol/L  02/13/20  03:34    


 


BUN  23 mg/dL (9-20)  H  02/13/20  03:34    


 


Creatinine  1.8 mg/dL (0.8-1.5)  H  02/13/20  03:34    


 


Estimated GFR  44 ml/min  02/13/20  03:34    


 


BUN/Creatinine Ratio  13 %  02/13/20  03:34    


 


Glucose  110 mg/dL ()  H  02/13/20  03:34    


 


POC Glucose  78  ()   02/11/20  12:05    


 


Lactic Acid  1.20 mmol/L (0.7-2.0)   02/08/20  09:24    


 


Calcium  7.9 mg/dL (8.4-10.2)  L  02/13/20  03:34    


 


Total Bilirubin  0.20 mg/dL (0.1-1.2)   02/07/20  22:56    


 


AST  15 units/L (5-40)   02/07/20  22:56    


 


ALT  7 units/L (7-56)   02/07/20  22:56    


 


Alkaline Phosphatase  69 units/L ()   02/07/20  22:56    


 


Troponin T  0.021 ng/mL (0.00-0.029)   02/07/20  22:56    


 


Total Protein  6.9 g/dL (6.3-8.2)   02/07/20  22:56    


 


Albumin  2.4 g/dL (3.9-5)  L  02/07/20  22:56    


 


Albumin/Globulin Ratio  0.5 %  02/07/20  22:56    


 


Urine Color  Yellow  (Yellow)   02/07/20  Unknown


 


Urine Turbidity  Slightly-cloudy  (Clear)   02/07/20  Unknown


 


Urine pH  5.0  (5.0-7.0)   02/07/20  Unknown


 


Ur Specific Gravity  1.013  (1.003-1.030)   02/07/20  Unknown


 


Urine Protein  <15 mg/dl mg/dL (Negative)   02/07/20  Unknown


 


Urine Glucose (UA)  Neg mg/dL (Negative)   02/07/20  Unknown


 


Urine Ketones  Neg mg/dL (Negative)   02/07/20  Unknown


 


Urine Blood  Sm  (Negative)   02/07/20  Unknown


 


Urine Nitrite  Neg  (Negative)   02/07/20  Unknown


 


Urine Bilirubin  Neg  (Negative)   02/07/20  Unknown


 


Urine Urobilinogen  < 2.0 mg/dL (<2.0)   02/07/20  Unknown


 


Ur Leukocyte Esterase  Sm  (Negative)   02/07/20  Unknown


 


Urine WBC (Auto)  19.0 /HPF (0.0-6.0)  H  02/07/20  Unknown


 


Urine RBC (Auto)  4.0 /HPF (0.0-6.0)   02/07/20  Unknown


 


U Epithel Cells (Auto)  < 1.0 /HPF (0-13.0)   02/07/20  Unknown


 


Urine Bacteria (Auto)  1+ /HPF (Negative)   02/07/20  Unknown


 


Hyaline Casts  1 /LPF  02/07/20  Unknown


 


Urine Mucus  Few /HPF  02/07/20  Unknown


 


Influenza A (Rapid)  Negative  (Negative)   02/14/20  Unknown


 


Influenza B (Rapid)  Negative  (Negative)   02/14/20  Unknown


 


Blood Type  O POSITIVE   02/08/20  02:30    


 


Antibody Screen  Negative   02/08/20  02:30    














Active Medications





- Current Medications


Current Medications: 














Generic Name Dose Route Start Last Admin





  Trade Name Freq  PRN Reason Stop Dose Admin


 


Acetaminophen  650 mg  02/08/20 00:43  02/12/20 20:48





  Tylenol  PO   650 mg





  Q4H PRN   Administration





  Pain MILD(1-3)/Fever >100.5/HA  


 


Diphenhydramine HCl  25 mg  02/08/20 18:44  02/14/20 23:38





  Benadryl  PO   25 mg





  Q6H PRN   Administration





  Itching  


 


Heparin Sodium (Porcine)  5,000 unit  02/08/20 10:00  02/15/20 09:39





  Heparin  SUB-Q   5,000 unit





  Q12HR DACIA   Administration


 


Dextrose  1,000 mls @ 75 mls/hr  02/10/20 09:00  02/15/20 14:36





  D5w  IV   75 mls/hr





  AS DIRECT DACIA   Administration


 


Metronidazole  500 mg in 100 mls @ 100 mls/hr  02/13/20 14:00  02/15/20 13:25





  Flagyl 500 Mg/100 Ml  IV   100 mls/hr





  Q8HR DACIA   Administration





  Protocol  


 


Cefazolin Sodium 2 gm/ Sodium  100 mls @ 200 mls/hr  02/14/20 22:00  02/15/20 

09:38





  Chloride  IV   200 mls/hr





  Q12HR DACIA   Administration


 


Ondansetron HCl  4 mg  02/08/20 00:43  02/10/20 12:21





  Zofran  IV   4 mg





  Q8H PRN   Administration





  Nausea And Vomiting  


 


Sodium Chloride  10 ml  02/08/20 10:00  02/15/20 09:39





  Sodium Chloride Flush Syringe 10 Ml  IV   10 ml





  BID DACIA   Administration


 


Sodium Chloride  10 ml  02/08/20 00:43 





  Sodium Chloride Flush Syringe 10 Ml  IV  





  PRN PRN  





  LINE FLUSH  


 


Triamcinolone Acetonide  1 applic  02/10/20 22:00  02/15/20 09:39





  Kenalog  TP   1 applic





  BID DACIA   Administration














Nutrition/Malnutrition Assess





- Dietary Evaluation


Nutrition/Malnutrition Findings: 


                                        





Nutrition Notes                                            Start:  02/08/20 

11:12


Freq:                                                      Status: Active       




Protocol:                                                                       




 Document     02/13/20 12:04  CW  (Rec: 02/13/20 12:17  CW  [id number])


 Co-Sign      02/13/20 12:04  LP


 Nutrition Notes


     Initial or Follow up                        Reassessment


     Current Diagnosis                           CKD(stage I-IV),Sepsis,


                                                 Hypertension,Stroke


     Other Pertinent Diagnosis                   AMS, dementia, Acute Metabolic


                                                 Encephalopathy, wounds


     Current Diet                                Regular Pureed Diet


     Labs/Tests                                  BUN 23


                                                 Cr 1.8


                                                 


     Pertinent Medications                       D5w at 75 ml/hr


     Height                                      5 ft 6 in


     Weight                                      71.3 kg


     Usual Body Weight                           65.771 kg


     Ideal Body Weight (kg)                      64.54


     BMI                                         25.3


     Intake Prior to Admission                   Good


     Weight Status                               Overweight


     Subjective/Other Information                F/U PO/ONS need. Diet advanced


                                                 to pureed with thin liquids


                                                 via SLP. Pt denied offer for


                                                 Landon supplement. Per RN notes


                                                 , pt had an instance of


                                                 vomiting. Pt states that he is


                                                 tolerating pureed diet. Per


                                                 charts pt ate 100


                                                 % meals yesterday and 50%


                                                 breakfast today.


     Percent of energy/protein needs met:        100%/100%


     Burn                                        Absent


     Trauma                                      Absent


     GI Symptoms                                 None


     Food Allergy                                No


     Current % PO                                Good (%)


     Minimum of two criteria                     No


     Body Fat Depletion                          Mild depletion (non-severe)


     #1


      Nutrition Diagnosis                        Increased nutrient needs   (


                                                 specify in comment below)


      Comments:                                  Protein


      Diagnosis Progress(for reassessment        Continues


       documentation)                            


     Is patient on ventilator?                   No


     Is Patient Ambulatory and/or Out of Bed     No


     [facility]      [phone]


     Calculation Used for Recommendations        [facility]


     Additional Notes                            Protein needs:  g/day (0


                                                 .8-1.5 g/kg/day) CKD and wound


                                                 healing


                                                 Fluid needs: 1 ml/kcal or per


                                                 MD


 Nutrition Intervention


     Change Diet Order:                          Continue Pureed diet with thin


                                                 liquids


     Goal #1                                     Continue to meet at least 75%


                                                 of energy and protein needs


     Anticipated Discharge Needs:                Pureed Diet


     Follow-Up By:                               02/18/20


     Additional Comments                         F/U for stable PO intakes

## 2020-02-15 NOTE — DISCHARGE SUMMARY
Providers





- Providers


Date of Admission: 


02/08/20 04:00





Date of discharge: 02/15/20


Attending physician: 


ALFREDO ALEXANDRE





                                        





02/08/20 04:18


Consult to Wound/ET Nurse [CONS] Routine 


   Reason For Exam: wound eval excoriation back, sides, scrotum, groin





02/10/20 12:05


Physical Therapy Evaluation and Treat [CONS] Routine 


   Comment: 


   Reason For Exam: weakness





02/10/20 13:56


Speech Therapy Evaluation and Treat [CONS] Routine 


   Reason For Exam: SWALLOWING EVALUATION





02/12/20 12:12


Consult to Physician [CONS] Routine 


   Comment: 


   Consulting Provider: PRIMO BAUMANN


   Physician Instructions: 


   Reason For Exam: Sepsis, cellulitis buttocks, UTI











[facility] 


[facility]








Hospitalization


Condition: Fair


Disposition: DC-01 TO HOME OR SELFCARE





Exam





- Constitutional


Vitals: 


                                        











Temp Pulse Resp BP Pulse Ox


 


 98.5 F   73   18   132/68   96 


 


 02/15/20 12:41  02/15/20 12:41  02/15/20 12:41  02/15/20 12:41  02/15/20 12:41














Plan


Activity: advance as tolerated


Diet: other (Pureed diet)


Plan of Treatment: 


1.Follow up with PCP in 1 week.


2.Continue with home hospice


Follow up with: 


[facility] [Primary Care Provider] - 7 Days


Prescriptions: 


cefUROXime [Ceftin] 500 mg PO Q12H 3 Days  tablet


metroNIDAZOLE [Flagyl] 500 mg PO Q8HR #10 tablet


Triamcinolone 0.1% [Kenalog 0.1% CREAM] 1 applic TP BID #1 tube

## 2020-02-16 VITALS — SYSTOLIC BLOOD PRESSURE: 140 MMHG | DIASTOLIC BLOOD PRESSURE: 76 MMHG

## 2020-02-16 LAB
HCT VFR BLD CALC: 32.7 % (ref 35.5–45.6)
HGB BLD-MCNC: 10.7 GM/DL (ref 11.8–15.2)
MCHC RBC AUTO-ENTMCNC: 33 % (ref 32–34)
MCV RBC AUTO: 87 FL (ref 84–94)
PLATELET # BLD: 435 K/MM3 (ref 140–440)
RBC # BLD AUTO: 3.76 M/MM3 (ref 3.65–5.03)

## 2020-02-16 RX ADMIN — METRONIDAZOLE SCH MLS/HR: 5 INJECTION, SOLUTION INTRAVENOUS at 05:03

## 2024-10-06 NOTE — CONSULTATION
History of Present Illness


Consult date: 06/06/19


Reason for Consult: stroke alert


Chief complaint: 


87 yo male with h/o strokes in the past, most recent [id number] with residual 

right sided motor deficits and language deficits- today patient was found 

slumped over not breathing well and EMS was called; patient was less responsive 

than normal per wife; EMS stated O2 sat was in the 80's upon arriving; wife 

isn't here for discussion; no blood thinners, unclear what type of strokes he 

has had, unclear if he had been sleeping prior to wife finding him, unclear the 

last few days of how he has been doing. 





PMH/SH/FH/ROS noted in EHR and reviewed








Medications and Allergies


                                    Allergies











Allergy/AdvReac Type Severity Reaction Status Date / Time


 


No Known Allergies Allergy   Verified 06/06/19 18:56











                                Home Medications











 Medication  Instructions  Recorded  Confirmed  Last Taken  Type


 


Unobtainable  06/06/19 06/06/19 Unknown History














Physical Examination





- Vital Signs


Vital Signs: 


                                   Vital Signs











Temp Pulse Resp BP Pulse Ox


 


 99.6 F   96 H  17   141/90   93 


 


 06/06/19 19:12  06/06/19 19:12  06/06/19 19:12  06/06/19 19:12  06/06/19 19:12














- Level of Consciousness


1a. Level of Consciousness: alert/keenly responsive





- LOC Questions


1b. LOC Questions: answers no questions correctly





- LOC Command


1c. LOC Commands: performs no tasks correctly





- Best Gaze


2. Best Gaze: partial gaze palsy





- Visual


3. Visual: no visual loss





- Motor Arm


5a. Motor Arm Left: drift


5b. Motor Arm Right: no gravity effort





- Motor Leg


6a. Motor Leg Left: some gravity effort


6b. Motor Leg Right: no gravity effort





- Limb Ataxia


7. Limb Ataxia: absent





- Sensory


8. Sensory: mild/moderate sensory loss





- Best Language


9. Best Language: mute/global aphasia





- Dysarthria


10. Dysarthria: mute/anarrthric





- Extinction and Inattention


11. Extinction/Inattention: visual/tactile inattention (Patient with O2 sat 87% 

at this time, he at times seems to have gaze deviation to the left; no clear 

seizure movements; he eventually came around to talking to the ED doctor- able 

to tell her his name, age, and follow some commands)





Results





- Laboratory Findings


CBC and BMP: 


                                 06/06/19 19:26





Abnormal Lab Findings: 


                                  Abnormal Labs











  06/06/19 06/06/19





  19:16 19:26


 


Mono % (Auto)   8.7 H


 


Eos % (Auto)   8.8 H


 


Mono #   0.9 H


 


Eos #   0.9 H


 


POC Glucose  120 H 














- Diagnostic Findings


Additional findings: 





CT head w/o bleed, evidence of left deep structure old stroke





Assessment and Plan











[facility]





Impression: 


1. r/o seizure vs new stroke vs metabolic/toxic/infectious cause of general 

decompensation


2. h/o strokes affecting right face/body and speech


3. reduced oxygen saturation





Recommendations: 


- not a tPA candidate due to concern for LKN time, and unclear history


- likely not an LVO given coming around now and talking to ED doctor- however 

discussed obtaining CTA H/N if Cr not abnormal; if it is would pursue MRI/MRA 

H/N w/o cst and EEG on inpatient side


- defer respiratory w/u to ED doc





d/w ED doc in detail


Laurence Quarles MD


[facility]





LKN: [id number]


ts called [id number]


ts connected [id number]


NIHSS [id number]


door: [phone]


date of service 6/6/19





---------------------------------------------------------------------


Medical Decision Making: 


- Extensive number of diagnosis or management options are considered above. 


- Extensive amount of complex data reviewed. 


- High risk of complication and/or morbidity or mortality are associated with 

differential diagnostic considerations above. 


- There may be uncertain outcome and increased probability of prolonged 

functional impairment or high probability of severe prolonged functional 

impairment associated with some of these differential diagnosis. 





Medical Data Reviewed: 


1.Data reviewed include clinical labs, radiology, Medical Tests; 


2.Tests results discussed w/performing or interpreting physician; 


3.Obtaining/reviewing old medical records; 


4.Obtaining case history from another source; 


5.Independent review of image, tracing or specimen. 








Patient was informed the [facility]rology Consult would happen [facility] 

by way of interactive audio and video telecommunicationsand consented to 

receiving care in this manner.
Patent